# Patient Record
Sex: FEMALE | Race: WHITE | Employment: FULL TIME | URBAN - METROPOLITAN AREA
[De-identification: names, ages, dates, MRNs, and addresses within clinical notes are randomized per-mention and may not be internally consistent; named-entity substitution may affect disease eponyms.]

---

## 2018-04-04 ENCOUNTER — OFFICE VISIT (OUTPATIENT)
Dept: OBGYN | Facility: CLINIC | Age: 28
End: 2018-04-04
Attending: NURSE PRACTITIONER
Payer: COMMERCIAL

## 2018-04-04 VITALS
BODY MASS INDEX: 22.13 KG/M2 | WEIGHT: 146 LBS | SYSTOLIC BLOOD PRESSURE: 108 MMHG | HEART RATE: 60 BPM | HEIGHT: 68 IN | DIASTOLIC BLOOD PRESSURE: 67 MMHG

## 2018-04-04 DIAGNOSIS — J45.20 MILD INTERMITTENT ASTHMA WITHOUT COMPLICATION: ICD-10-CM

## 2018-04-04 DIAGNOSIS — F39 EPISODIC MOOD DISORDER (H): ICD-10-CM

## 2018-04-04 DIAGNOSIS — Z76.89 ESTABLISHING CARE WITH NEW DOCTOR, ENCOUNTER FOR: Primary | ICD-10-CM

## 2018-04-04 DIAGNOSIS — F32.81 PMDD (PREMENSTRUAL DYSPHORIC DISORDER): ICD-10-CM

## 2018-04-04 PROCEDURE — G0463 HOSPITAL OUTPT CLINIC VISIT: HCPCS | Mod: ZF

## 2018-04-04 RX ORDER — ALBUTEROL SULFATE 90 UG/1
1-2 AEROSOL, METERED RESPIRATORY (INHALATION)
COMMUNITY
Start: 2018-01-23 | End: 2018-04-04

## 2018-04-04 RX ORDER — ALBUTEROL SULFATE 90 UG/1
1-2 AEROSOL, METERED RESPIRATORY (INHALATION) EVERY 6 HOURS PRN
Qty: 1 INHALER | Refills: 1 | Status: SHIPPED | OUTPATIENT
Start: 2018-04-04 | End: 2018-11-08

## 2018-04-04 RX ORDER — ACETAMINOPHEN AND CODEINE PHOSPHATE 120; 12 MG/5ML; MG/5ML
1 SOLUTION ORAL DAILY
Qty: 28 TABLET | Refills: 3 | Status: SHIPPED | OUTPATIENT
Start: 2018-04-04 | End: 2018-07-24

## 2018-04-04 RX ORDER — ATOVAQUONE AND PROGUANIL HYDROCHLORIDE 250; 100 MG/1; MG/1
1 TABLET, FILM COATED ORAL
COMMUNITY
Start: 2018-01-23 | End: 2018-04-04

## 2018-04-04 ASSESSMENT — ASTHMA QUESTIONNAIRES
QUESTION_4 LAST FOUR WEEKS HOW OFTEN HAVE YOU USED YOUR RESCUE INHALER OR NEBULIZER MEDICATION (SUCH AS ALBUTEROL): TWO OR THREE TIMES PER WEEK
QUESTION_2 LAST FOUR WEEKS HOW OFTEN HAVE YOU HAD SHORTNESS OF BREATH: ONCE OR TWICE A WEEK
QUESTION_1 LAST FOUR WEEKS HOW MUCH OF THE TIME DID YOUR ASTHMA KEEP YOU FROM GETTING AS MUCH DONE AT WORK, SCHOOL OR AT HOME: NONE OF THE TIME
QUESTION_3 LAST FOUR WEEKS HOW OFTEN DID YOUR ASTHMA SYMPTOMS (WHEEZING, COUGHING, SHORTNESS OF BREATH, CHEST TIGHTNESS OR PAIN) WAKE YOU UP AT NIGHT OR EARLIER THAN USUAL IN THE MORNING: NOT AT ALL
ACT_TOTALSCORE: 21
QUESTION_5 LAST FOUR WEEKS HOW WOULD YOU RATE YOUR ASTHMA CONTROL: WELL CONTROLLED

## 2018-04-04 ASSESSMENT — ANXIETY QUESTIONNAIRES
5. BEING SO RESTLESS THAT IT IS HARD TO SIT STILL: NOT AT ALL
6. BECOMING EASILY ANNOYED OR IRRITABLE: SEVERAL DAYS
4. TROUBLE RELAXING: NOT AT ALL
7. FEELING AFRAID AS IF SOMETHING AWFUL MIGHT HAPPEN: NOT AT ALL
3. WORRYING TOO MUCH ABOUT DIFFERENT THINGS: NOT AT ALL
GAD7 TOTAL SCORE: 1
1. FEELING NERVOUS, ANXIOUS, OR ON EDGE: NOT AT ALL
2. NOT BEING ABLE TO STOP OR CONTROL WORRYING: NOT AT ALL

## 2018-04-04 ASSESSMENT — PAIN SCALES - GENERAL: PAINLEVEL: NO PAIN (0)

## 2018-04-04 NOTE — PROGRESS NOTES
"    Progress Note    SUBJECTIVE:  Yanni Mckenna is a 28 year old, , who requests to establish care and discuss cyclic premenstrual symptoms. She recently moved from [place] during the past year. Her last well woman exam in [place] revealed a normal pap and neg HPV.    Concerns today include:  1. Establish care, moved to [place] from [place] 9 months ago  2. Psych Hx: patient stopped medications in , previous dx of bipolar  3. Cyclic pre menstrual symptoms-mood and irritability prior to menses are her greatest concerns today. Has monthly menses on Mirena with clear premenstrual cyclic symptoms. She is particularly interested in non-medication interventions to address her symptoms.     Yanni reports having a history of mental illness, including bipolar disorder, for which she was prescribed several medications including: Abilify, Adderol, Wellbutrin, and Lamictal, but discontinued herself from all of these medications in . She has good weeks, particularly during the follicular phase of her menstrual cycle. She has been tracking her symptoms and associates her increased moodiness with her menses, noticing worsening mood swings, irritability, breast tenderness, and dysmenorrhea before and during menses.   Yanni indicates her symptoms also worsened after the \"honeymoon phase\" of her new job and recent move dissipated and once she moved in with her boyfriend. She reports being in a monogamous relationship with her boyfriend for the past 2.5 years.     PHQ-9 SCORE 2018   Total Score 6      TIMOTHY-7 SCORE 2018   Total Score 1     Normal LMP: 3/30/18  Yanni reports having regular menstrual periods, typically lasting 3-4 days, with mild flow and is using the Mirena IUD for contraception (placed ). She was last screened for STIs in , but declines screening today.     Social Hx: manages [employer] .   Exercise:; regular exercise 5-6 days per week doing running, spin, or yoga; also teaches yoga  "   Caffeine: 1-2 cups per day  Substance: ETOH few times per week, 1-2 drinks on 2 days per week   Tobacco use: never  Cannibus to help her fall asleep, nightly     Yanni reports a remote history of migraine with uncertain visuall changes starting on COCs >10 years ago.Used COCs faster this episode for 10 years. Denies any  history of blood clots, breast cancer or ovarian cancer, or liver disease.     Menstrual History:  Menstrual History 2018   Menarche age 10   Period Cycle (Days) 28   Period Duration (Days) 3   Method of Contraception Mirena IUD   Period Pattern Regular   Menstrual Flow Light   Menstrual Control Tampon   Dysmenorrhea Mild   PMS Symptoms Cramping;Mood Changes   Reviewed Today Yes     Last  No results found for: PAP  History of abnormal Pap smear: NO - age 30- 65 PAP every 3 years recommended    Last No results found for: HPV16  Last No results found for: HPV18  Last No results found for: HRHPV    Mammogram current: not applicable  Last Mammogram: N/A    HISTORY:    No current outpatient prescriptions on file prior to visit.  No current facility-administered medications on file prior to visit.   Allergies   Allergen Reactions     Amoxicillin Hives     Immunization History   Administered Date(s) Administered     HepA-Adult 2007     HepB, Unspecified 10/29/1997, 1997, 1998     Hpv, Unspecified  2007, 2007, 2008     Influenza (IIV3) PF 10/09/1994, 1994, 1994     MMR 1990, 1994     Meningococcal,unspecified 2007     Pedvax-hib 1990     Pneumococcal 23 valent 04/15/2016     TD (ADULT, 7+) 1999     Typhoid IM 2018     Typhoid Oral 2018       Obstetric History       T0      L0     SAB0   TAB0   Ectopic0   Multiple0   Live Births0      Past Medical History:   Diagnosis Date     Asthma      Past Surgical History:   Procedure Laterality Date     NO HISTORY OF SURGERY       History reviewed. No  "pertinent family history.  Social History     Social History     Marital status: Single     Spouse name: N/A     Number of children: N/A     Years of education: N/A     Occupational History           Social History Main Topics     Smoking status: Never Smoker     Smokeless tobacco: Never Used     Alcohol use 3.0 oz/week     5 Standard drinks or equivalent per week     Drug use: No     Sexual activity: Yes     Partners: Male     Birth control/ protection: IUD     Other Topics Concern     None     Social History Narrative    How much exercise per week? Active daily,     How much calcium per day? In foods       How much caffeine per day? 1 cup coffee    How much vitamin D per day? Multivitamin    Do you/your family wear seatbelts?  Yes    Do you/your family use safety helmets? No    Do you/your family use sunscreen? Yes    Do you/your family keep firearms in the home? No    Do you/your family have a smoke detector(s)? Yes         April 4, 2018 Fco Jimenez LPN         ROS  [unfilled]  No flowsheet data found.  No flowsheet data found.    EXAM:  Blood pressure 108/67, pulse 60, height 1.715 m (5' 7.5\"), weight 66.2 kg (146 lb). Body mass index is 22.53 kg/(m^2).  Constitutional: Well appearing woman in no acute distress, appropriately groomed   Oriented to time and place, engaged and interactive   Psychological: Appropriate mood  Eyes symmetrical, sclera clear and white, conjunctiva pink and moist   Mouth: moist mucous membranes, no visible dental caries   Skin: warm and dry, no visible rashes or lesions  Neuro: normal gait, no visible tremor   Musculoskeletal: Full ROM, no muscular weakness visible   30 minutes total time was spent in direct contact with patient and > 50% of the time in patient education and coordination of care.     ASSESSMENT:  Encounter Diagnoses   Name Primary?     Establishing care with new doctor, encounter for Yes     Episodic mood disorder (H)      PMDD (premenstrual " "dysphoric disorder)      Visit for preventive health examination       PLAN:   Orders Placed This Encounter   Procedures     ACUPUNCTURE REFERRAL     Orders Placed This Encounter   Medications     albuterol (PROAIR HFA/PROVENTIL HFA/VENTOLIN HFA) 108 (90 BASE) MCG/ACT Inhaler     Sig: Inhale 1-2 puffs into the lungs every 6 hours as needed for shortness of breath / dyspnea or wheezing     Dispense:  1 Inhaler     Refill:  1     norethindrone (MICRONOR) 0.35 MG per tablet     Sig: Take 1 tablet (0.35 mg) by mouth daily     Dispense:  28 tablet     Refill:  3     1. Trial patient on POP pill for 3 months to stop ovulation. Benefits, risks, and side effects reviewed with patient. Instructed Yanni to take start this pill today, taking one pill daily at the same time each day.   2. Schedule acupuncture if desires to help with mood changes, referral given per patient request  3. Teaching references on lifestyle management of PMDD reviewed with patient, handouts given  4. Recommend that she establish care with therapist  5. Start Micronor Rx and chart symptoms  6. Return to clinic in 3 months for follow up and an annual exam    I, Anh Perry, MSN, RN, NP Student, completed the PFSH and ROS. I then acted as a scribe for Marva SEWELL for the remainder of the visit. Anh Perry, MSN, RN WHNP Student.     \"I agree with the PFSH and ROS as completed by the NP Student, Anh Perry, except for changes made by me. The remainder of the encounter was performed by me and scribed by Anh Perry, MSN, RN, NDP-student. The scribed note accurately reflects my personal services and decisions made by me.    Marva SEWELL    "

## 2018-04-04 NOTE — LETTER
My Asthma Action Plan  Name: Yanni Mckenna   YOB: 1990  Date: 4/4/2018   My doctor: CHIOMA Gomez CNP   My clinic: [facility]        My Control Medicine: None  My Rescue Medicine: Albuterol (Proair/Ventolin/Proventil) inhaler 1-2 puffs PRN during exercise   My Asthma Severity: intermittent  Avoid your asthma triggers: exercise or sports and cold air               GREEN ZONE   Good Control    I feel good    No cough or wheeze    Can work, sleep and play without asthma symptoms       Take your asthma control medicine every day.     1. If exercise triggers your asthma, take your rescue medication    15 minutes before exercise or sports, and    During exercise if you have asthma symptoms  2. Spacer to use with inhaler: If you have a spacer, make sure to use it with your inhaler             YELLOW ZONE Getting Worse  I have ANY of these:    I do not feel good    Cough or wheeze    Chest feels tight    Wake up at night   1. Keep taking your Green Zone medications  2. Start taking your rescue medicine:    every 20 minutes for up to 1 hour. Then every 4 hours for 24-48 hours.  3. If you stay in the Yellow Zone for more than 12-24 hours, contact your doctor.  4. If you do not return to the Green Zone in 12-24 hours or you get worse, start taking your oral steroid medicine if prescribed by your provider.           RED ZONE Medical Alert - Get Help  I have ANY of these:    I feel awful    Medicine is not helping    Breathing getting harder    Trouble walking or talking    Nose opens wide to breathe       1. Take your rescue medicine NOW  2. If your provider has prescribed an oral steroid medicine, start taking it NOW  3. Call your doctor NOW  4. If you are still in the Red Zone after 20 minutes and you have not reached your doctor:    Take your rescue medicine again and    Call 911 or go to the emergency room right away    See your regular doctor within 2 weeks of an Emergency Room  or Urgent Care visit for follow-up treatment.          Annual Reminders:  Meet with Asthma Educator,  Flu Shot in the Fall, consider Pneumonia Vaccination for patients with asthma (aged 19 and older).    Pharmacy: [id number] - [place], [place] - [address] [place] [address]                      Asthma Triggers  How To Control Things That Make Your Asthma Worse    Triggers are things that make your asthma worse.  Look at the list below to help you find your triggers and what you can do about them.  You can help prevent asthma flare-ups by staying away from your triggers.      Trigger                                                          What you can do   Cigarette Smoke  Tobacco smoke can make asthma worse. Do not allow smoking in your home, car or around you.  Be sure no one smokes at a child s day care or school.  If you smoke, ask your health care provider for ways to help you quit.  Ask family members to quit too.  Ask your health care provider for a referral to Quit Plan to help you quit smoking, or call 9-942-817-PLAN.     Colds, Flu, Bronchitis  These are common triggers of asthma. Wash your hands often.  Don t touch your eyes, nose or mouth.  Get a flu shot every year.     Dust Mites  These are tiny bugs that live in cloth or carpet. They are too small to see. Wash sheets and blankets in hot water every week.   Encase pillows and mattress in dust mite proof covers.  Avoid having carpet if you can. If you have carpet, vacuum weekly.   Use a dust mask and HEPA vacuum.   Pollen and Outdoor Mold  Some people are allergic to trees, grass, or weed pollen, or molds. Try to keep your windows closed.  Limit time out doors when pollen count is high.   Ask you health care provider about taking medicine during allergy season.     Animal Dander  Some people are allergic to skin flakes, urine or saliva from pets with fur or feathers. Keep pets with fur or feathers out of your home.    If you can t keep the pet  outdoors, then keep the pet out of your bedroom.  Keep the bedroom door closed.  Keep pets off cloth furniture and away from stuffed toys.     Mice, Rats, and Cockroaches  Some people are allergic to the waste from these pests.   Cover food and garbage.  Clean up spills and food crumbs.  Store grease in the refrigerator.   Keep food out of the bedroom.   Indoor Mold  This can be a trigger if your home has high moisture. Fix leaking faucets, pipes, or other sources of water.   Clean moldy surfaces.  Dehumidify basement if it is damp and smelly.   Smoke, Strong Odors, and Sprays  These can reduce air quality. Stay away from strong odors and sprays, such as perfume, powder, hair spray, paints, smoke incense, paint, cleaning products, candles and new carpet.   Exercise or Sports  Some people with asthma have this trigger. Be active!  Ask your doctor about taking medicine before sports or exercise to prevent symptoms.    Warm up for 5-10 minutes before and after sports or exercise.     Other Triggers of Asthma  Cold air:  Cover your nose and mouth with a scarf.  Sometimes laughing or crying can be a trigger.  Some medicines and food can trigger asthma.

## 2018-04-04 NOTE — LETTER
My Asthma Action Plan  Name: Yanni Mckenna   YOB: 1990  Date: 4/4/2018   My doctor: CHIOMA Gomez CNP   My clinic: [facility]        My Control Medicine: None  My Rescue Medicine: Albuterol (Proair/Ventolin/Proventil) inhaler PRN   My Asthma Severity: { :201487}  Avoid your asthma triggers: { :455044}        {Is patient a child or adult?:311718}       GREEN ZONE   Good Control    I feel good    No cough or wheeze    Can work, sleep and play without asthma symptoms       Take your asthma control medicine every day.     1. If exercise triggers your asthma, take your rescue medication    15 minutes before exercise or sports, and    During exercise if you have asthma symptoms  2. Spacer to use with inhaler: If you have a spacer, make sure to use it with your inhaler             YELLOW ZONE Getting Worse  I have ANY of these:    I do not feel good    Cough or wheeze    Chest feels tight    Wake up at night   1. Keep taking your Green Zone medications  2. Start taking your rescue medicine:    every 20 minutes for up to 1 hour. Then every 4 hours for 24-48 hours.  3. If you stay in the Yellow Zone for more than 12-24 hours, contact your doctor.  4. If you do not return to the Green Zone in 12-24 hours or you get worse, start taking your oral steroid medicine if prescribed by your provider.           RED ZONE Medical Alert - Get Help  I have ANY of these:    I feel awful    Medicine is not helping    Breathing getting harder    Trouble walking or talking    Nose opens wide to breathe       1. Take your rescue medicine NOW  2. If your provider has prescribed an oral steroid medicine, start taking it NOW  3. Call your doctor NOW  4. If you are still in the Red Zone after 20 minutes and you have not reached your doctor:    Take your rescue medicine again and    Call 911 or go to the emergency room right away    See your regular doctor within 2 weeks of an Emergency Room or Urgent  Care visit for follow-up treatment.          Annual Reminders:  Meet with Asthma Educator,  Flu Shot in the Fall, consider Pneumonia Vaccination for patients with asthma (aged 19 and older).    Pharmacy: [id number] - [place], [place] - [address] [place] [address]                      Asthma Triggers  How To Control Things That Make Your Asthma Worse    Triggers are things that make your asthma worse.  Look at the list below to help you find your triggers and what you can do about them.  You can help prevent asthma flare-ups by staying away from your triggers.      Trigger                                                          What you can do   Cigarette Smoke  Tobacco smoke can make asthma worse. Do not allow smoking in your home, car or around you.  Be sure no one smokes at a child s day care or school.  If you smoke, ask your health care provider for ways to help you quit.  Ask family members to quit too.  Ask your health care provider for a referral to Quit Plan to help you quit smoking, or call 1-091-970-PLAN.     Colds, Flu, Bronchitis  These are common triggers of asthma. Wash your hands often.  Don t touch your eyes, nose or mouth.  Get a flu shot every year.     Dust Mites  These are tiny bugs that live in cloth or carpet. They are too small to see. Wash sheets and blankets in hot water every week.   Encase pillows and mattress in dust mite proof covers.  Avoid having carpet if you can. If you have carpet, vacuum weekly.   Use a dust mask and HEPA vacuum.   Pollen and Outdoor Mold  Some people are allergic to trees, grass, or weed pollen, or molds. Try to keep your windows closed.  Limit time out doors when pollen count is high.   Ask you health care provider about taking medicine during allergy season.     Animal Dander  Some people are allergic to skin flakes, urine or saliva from pets with fur or feathers. Keep pets with fur or feathers out of your home.    If you can t keep the pet outdoors, then  keep the pet out of your bedroom.  Keep the bedroom door closed.  Keep pets off cloth furniture and away from stuffed toys.     Mice, Rats, and Cockroaches  Some people are allergic to the waste from these pests.   Cover food and garbage.  Clean up spills and food crumbs.  Store grease in the refrigerator.   Keep food out of the bedroom.   Indoor Mold  This can be a trigger if your home has high moisture. Fix leaking faucets, pipes, or other sources of water.   Clean moldy surfaces.  Dehumidify basement if it is damp and smelly.   Smoke, Strong Odors, and Sprays  These can reduce air quality. Stay away from strong odors and sprays, such as perfume, powder, hair spray, paints, smoke incense, paint, cleaning products, candles and new carpet.   Exercise or Sports  Some people with asthma have this trigger. Be active!  Ask your doctor about taking medicine before sports or exercise to prevent symptoms.    Warm up for 5-10 minutes before and after sports or exercise.     Other Triggers of Asthma  Cold air:  Cover your nose and mouth with a scarf.  Sometimes laughing or crying can be a trigger.  Some medicines and food can trigger asthma.

## 2018-04-04 NOTE — PATIENT INSTRUCTIONS
"  Understanding PMS and Your Cycle  PMS (premenstrual syndrome) is a medical condition caused by the body's response to a normal menstrual cycle. The menstrual cycle is brought on by changing levels of hormones (chemical messengers) in the body. In some women, normal hormone changes are linked to decreases in serotonin, a brain chemical that improves mood. These changes lead to PMS symptoms each month.  The menstrual cycle  During the menstrual cycle, a series of hormone changes prepare a woman's body for pregnancy. The ovaries make hormones, which include estrogen and progesterone. Throughout the cycle, the levels of these hormones change. This causes the lining of the uterus (womb) to thicken. Hormone changes also lead to ovulation (release of an egg). If a woman doesn't become pregnant, her body sheds the thickened lining and the egg during the menstrual period. For many women, the menstrual cycle lasts 4 weeks (28 days). Some women have shorter cycles. Others have longer ones. No matter how many days your cycle is, you can have PMS only if you ovulate.  The PMS cycle  No one knows why some women have PMS and others don't. But PMS symptoms are closely linked to changing levels of estrogen, serotonin, and progesterone:    Estrogen rises during the first half of the menstrual cycle and drops during the second half. In some women, serotonin levels stay mostly steady. But in women with PMS, serotonin drops as estrogen drops. This means serotonin is lowest in the 2 weeks before the period. Women with low serotonin levels are likely to have symptoms of PMS.    Progesterone can have a \"calming\" effect on the body. This can ease physical symptoms caused by the body's monthly changes. In women with PMS, progesterone may not have this calming effect. This may make symptoms more severe.  Common symptoms of PMS  Physical symptoms  You may have some or all of the following:    Bloating (retaining water)    Breast tenderness  "   Food cravings    Muscle aches    Swelling of hands and feet    Appetite changes    Headache    Feeling tired    Skin problems    Abdominal pain  Emotional symptoms  You may have some or all of the following:    Mood swings    Depression    Crying spells    Irritability    Oversensitivity    Withdrawing from friends and family    Forgetfulness    Having trouble concentrating    Anxiety    Insomnia    Angry outbursts    Confusion    Changes in sexual desire  Date Last Reviewed: 3/1/2017    4624-5090 [employer]. [address] [place] [address], [place] [address]. All rights reserved. This information is not intended as a substitute for professional medical care. Always follow your healthcare professional's instructions.        Managing PMS: Diet and Nutrition     Nutrients in fresh fruits and vegetables can help you manage PMS.     Maintaining a healthy diet helps your body counter PMS. Certain foods boost serotonin levels and give you the energy to cope with symptoms. Other foods can be avoided to ease symptoms.  About supplements  Ask your healthcare provider about supplements before trying them.   Benefits of a balanced diet  To counter PMS symptoms, maintain a balanced diet. Eat foods from all the food groups: dairy, grains, fruits and vegetables, and protein. When planning meals, know that:    Calcium may ease mood swings, headache, bloating, and irritability. It's found in dairy products such as milk, cheese, and yogurt. Some juices, breads, cereals, and soy products have calcium added (fortified).    Magnesium may relieve bloating and breast tenderness. It's found in many foods, including fresh fruits and vegetables. To help your body get enough magnesium, eat 5 or more servings of a variety of fruits and vegetables a day.    Vitamin B6 helps the body use serotonin, thereby helping to ease depression. It's found in chicken, fish, potatoes, eggs, and carrots.    Vitamin E may reduce headache and breast  "tenderness. It's found in nuts such as almonds, peanuts, and hazelnuts. It's also found in green leafy vegetables.  \"Good mood\" foods  Eating foods high in carbohydrates (carbs) and fiber can help you manage PMS. That's because carbs raise serotonin levels. Carbs are also your body's main source of energy. To help keep energy and serotonin levels steady, eat small amounts throughout the day. High-fiber carbs include:    Whole-grain foods. Brown rice, whole-wheat pasta, whole-grain bread, and buckwheat noodles are good choices.    Fresh fruits and vegetables, especially when eaten unpeeled.    Beans and legumes, such as kidney beans, peas, and lentils.  Foods to limit  Some foods can make PMS symptoms worse. Know that:    Salt can cause bloating. Since canned vegetables are often high in salt, buy fresh instead. Flavor with herbs, lemon, or salt-free seasonings.    Sugar is a carb that provides only short bursts of energy. If you crave sugar, choose a food that's also high in fiber, like an unpeeled apple or a bran muffin.    Caffeine can disrupt sleep, which makes symptoms harder to cope with. Caffeine can also cause breast tenderness. Try to limit chocolate and caffeinated drinks, such as coffee or soda.    Alcohol can make you feel depressed and can disrupt sleep. Many kinds of alcohol are also high in sugar. You may try limiting the amount of alcohol you drink.  Date Last Reviewed: 3/1/2017    3744-6984 [employer]. [address] [place] [address], [place] [address]. All rights reserved. This information is not intended as a substitute for professional medical care. Always follow your healthcare professional's instructions.        Managing PMS: Lifestyle Changes    Coping with PMS takes energy. But, PMS symptoms can make you feel like you don t have the strength to cope. The trick is to work helpful strategies into your daily life. Be active during the day and get enough sleep at night. Take time to relax. " And don t be afraid to ask for support.  Being active    Activity raises the amount of oxygen in your body. This makes you feel better and gives you more energy. Exercise may also raise serotonin levels. For best results:    Try aerobic activities, such as walking, jogging, biking, swimming, or yoga.    Exercise for 30 minutes, most days of the week. If this seems like too much, start with 10 minutes a day and work your way up.    Find ways to fit activity into your day. Try taking the stairs instead of the elevator.  Sleeping well  When you re tired, PMS symptoms can be harder to cope with. Aim for at least 8 hours of sleep a night. To sleep better:    Follow a routine before bed. For instance, brush your teeth, read for half an hour, and turn out the lights at 10 p.m. every night.    Pull down window shades and keep pets out of the bedroom. If you re a light sleeper, try wearing earplugs and an eye mask to block out noise and light.  Taking time to relax  Being relaxed can give you the energy to deal with life s ups and downs. This makes even PMS symptoms easier to cope with. Learn to relax through simple techniques you can do anytime, anywhere. If you think you re too busy, start with just 5 minutes a day. Try:    Taking in a slow, deep breath through your nose. Hold it for 5 counts, then exhale through your mouth. Repeat this 3 times.    Picturing yourself in a peaceful place, such as the countryside. Explore with your mind. Hear birds. Smell freshly cut grass. Enjoy a mental vacation.    Stretching to relax muscles and reduce aches. (If you have back problems, ask your healthcare provider about stretches that are safe for you.)  Finding support  You don t have to deal with PMS alone. To help you cope:    Talk to family, friends, and coworkers.    Let them know how they can help when you re dealing with PMS symptoms.    Chat with female friends. Support each other. You may learn some new coping strategies.     Join a support group for women with PMS. Or try a stress management group. Ask your healthcare provider for resources.  Date Last Reviewed: 2/1/2017 2000-2017 [employer]. [address] [place] [address], [place] [address]. All rights reserved. This information is not intended as a substitute for professional medical care. Always follow your healthcare professional's instructions.        Managing PMS: Medications  With your healthcare provider s help, you may find a medicine that works for you. Make sure you know how to use your medicine. Take medicines only as directed. And always read warning labels. Talk to your healthcare provider or pharmacist if you have questions.  NSAIDs  NSAIDs (nonsteroidal anti-inflammatory drugs) relieve pain. Some can be bought over the counter. These include aspirin, ibuprofen, and naproxen. Stronger NSAIDs may be prescribed for more severe symptoms.    Benefits. NSAIDs relieve headaches, muscle aches, and other physical symptoms. They work best when taken at the first sign of symptoms.    Side effects. NSAIDs can cause stomach upset. Taking them with food may help.    Other concerns. You shouldn t take more than one type of NSAID at a time. Also, NSAIDs may not be safe if you have asthma, heart disease, or kidney problems.  Birth control pills  Birth control pills may be prescribed to control hormone levels. When prescribed for PMS, pills may be taken every day for up to 9 weeks.    Benefits. Birth control pills ease a range of symptoms including breast tenderness and appetite changes.    Side effects. Birth control pills can cause stomach upset and headache.    Other concerns. Rarely, birth control pills can cause blood clots. The risk is higher for cigarette smokers over age 35.  SSRIs  SSRIs (selective serotonin reuptake inhibitors) may be prescribed to help keep serotonin levels stable. SSRIs may be taken every day. Or, they may be taken only during the 2 weeks before your  period.    Benefits. SSRIs help relieve emotional symptoms including irritability, depression, and mood changes. They also ease physical symptoms such as bloating, breast tenderness, and appetite changes.    Side effects. SSRIs can cause stomach upset, sleep problems, anxiety, and headache. SSRIs may also reduce interest in sex. This is less likely when SSRIs are taken only for part of the menstrual cycle.    Other concerns. SSRIs shouldn t be used if you take certain antidepressants.  GNRH agonists  GNRH agonists may be used in severe cases when SSRIs and birth control pills are ineffective or can't be tolerated    Benefits. GNRH agonists stop the normal hormonal cycle effectively causing a temporary menopause so the cyclic changes are no longer present.    Side effects. GNRH agonists stop menses, cause hot flashes and facundo loss unless given with add back treatment, which consists of continuous doses of estrogen and progestin.  Surgery  This consists of removing the ovaries and should be reserved for very severe cases that do not respond to medical treatment. This should only be considered after very careful consideration of the long-term effects and only after childbearing is complete.  Date Last Reviewed: 3/1/2017    1039-3975 [employer]. [address] [place] [address]. All rights reserved. This information is not intended as a substitute for professional medical care. Always follow your healthcare professional's instructions.        PMS: Tracking Your Symptoms  Track your PMS symptoms to better understand when they may occur. Rank each symptom s severity from  worst  to  none.  Track any other symptoms in the blank rows. At the top of the chart, [place] the days of your period. This will help identify the weeks of your cycle.    Date Last Reviewed: 10/1/2016    4793-5400 [employer]. [address] [place] [address]. All rights reserved. This information is not  intended as a substitute for professional medical care. Always follow your healthcare professional's instructions.        PMS: Tracking Your Symptoms  Track your PMS symptoms to better understand when they may occur. Rank each symptom s severity from  worst  to  none.  Track any other symptoms in the blank rows. At the top of the chart, [place] the days of your period. This will help identify the weeks of your cycle.    Date Last Reviewed: 10/1/2016    4260-4670 [employer]. [address] [place] [address], [place] [address]. All rights reserved. This information is not intended as a substitute for professional medical care. Always follow your healthcare professional's instructions.          PREVENTIVE HEALTH RECOMMENDATIONS:   Most women need a yearly breast and pelvic exam.    A PAP screen, a test done DURING a pelvic exam, is NO longer recommended yearly.    March 2013, screening guidelines recommended by ACOG for PAP screen are:    1) First pap at age 21.    2) Pap every 3 years until age 30.    3) After age 30, pap every 3 years or Pap with HR HPV screen every 5 years until age 65.  4) Women do NOT need a vaginal Pap screen after a hysterectomy (surgical removal of the uterus) when they have not had cancer.    Exceptions:  1) Yearly pap if HIV+ or immunosuppressed secondary to organ transplant  2) BELINDA II-III continue routine screening for 20 years.    I encourage you continue looking for opportunities to choose a healthy lifestyle:       * Choose to eat a heart healthy diet. Check out the FOOD PLATE guidelines at: http://www.choosemyplate.gov/ for helpful hints on weight and cholesterol management.  Balance your caloric intake with exercise to maintain a BMI in the 22 to 26 range. For bone health: Eat calcium-rich foods like yogurt, broccoli or take chewable calcium pills (500 to 600 mg) twice a day with food.       * Exercise for at least an average of 30 minutes a day, 5 days of the week. This will help  you control your weight, release stress, and help prevent disease.      * Take a Vitamin D3 supplement daily fall through spring and during summer unless you raly99-21' full body sun exposure to skin without sunscreen.      * DO wear sunscreen to prevent skin cancer after the first 15-30 minutes.      * Identify stressors in your life, find ways to release the stress, and, make time for yourself. PLEASE ask for help if mood changes last longer than two weeks.     * Limit alcohol to one drink per day.  No smoking.  Avoid second hand smoke. If you smoke, ask for help to stop.       *  If you are in a sexual relationship, talk with your partner about possible infection risks and take action to protect yourself from exposure to a sexual infection.    Please request an infection screen for STIs (sexually transmitted infections) if you are less than age 26 OR believe that you may be at risk.     Get a flu shot each year. Get a tetanus shot every 10 years. EVERYONE needs a pertussis (Whooping cough) booster.    See your dentist twice a year for an exam and preventive care cleaning.     Consider the following screen tests:    1) cholesterol test every 5 years.     2) yearly mammogram after age 40 unless you have identified risks.    3) colonoscopy every 10 years after age 50 unless you have identified risks.    4) diabetes blood test screening if you are at risk for diabetes.      Additional information that you may also find helpful:  The Internet now gives us access to LOTS of information -- some of it helpful, research documented and also plenty of harmful, anecdotal information that may not pertain to your situtaion. Consider visiting the following websites for accurate health information:    www.vitamindcouncil.org/ : Info and ongoing research re Vitamin D    www.fairview.org : Up to date and easily searchable information on multiple topics.    www.medlineplus.gov : medication info, interactive tutorials, watch real  surgeries online    www.cdc.gov : public health info, travel advisories, epidemics (H1N1)    www.jatin/std.org: current research re diagnosis, treatment and prevention of sexually contacted infections.    www.health.[facility].[place].us : [place] dept of heatl, public health issues in [place], N1N1    www.familydoctor.org : good info from the Academy of Family Physicians      Return to clinic in 3 months for follow up  Recommend establish care with therapist  Start Rx, chart symptoms

## 2018-04-04 NOTE — LETTER
My Asthma Action Plan  Name: Yanni Mckenna   YOB: 1990  Date: 4/4/2018   My doctor: CHIOMA Gomez CNP   My clinic: [facility]        My Control Medicine: None  My Rescue Medicine: Albuterol (Proair/Ventolin/Proventil) inhaler 1-2 puffs PRN with exercise  My Oral Steroid Medicine: None My Asthma Severity: intermittent  Avoid your asthma triggers: exercise or sports and cold air               GREEN ZONE   Good Control    I feel good    No cough or wheeze    Can work, sleep and play without asthma symptoms       Take your asthma control medicine every day.     1. If exercise triggers your asthma, take your rescue medication    15 minutes before exercise or sports, and    During exercise if you have asthma symptoms  2. Spacer to use with inhaler: If you have a spacer, make sure to use it with your inhaler             YELLOW ZONE Getting Worse  I have ANY of these:    I do not feel good    Cough or wheeze    Chest feels tight    Wake up at night   1. Keep taking your Green Zone medications  2. Start taking your rescue medicine:    every 20 minutes for up to 1 hour. Then every 4 hours for 24-48 hours.  3. If you stay in the Yellow Zone for more than 12-24 hours, contact your doctor.  4. If you do not return to the Green Zone in 12-24 hours or you get worse, start taking your oral steroid medicine if prescribed by your provider.           RED ZONE Medical Alert - Get Help  I have ANY of these:    I feel awful    Medicine is not helping    Breathing getting harder    Trouble walking or talking    Nose opens wide to breathe       1. Take your rescue medicine NOW  2. If your provider has prescribed an oral steroid medicine, start taking it NOW  3. Call your doctor NOW  4. If you are still in the Red Zone after 20 minutes and you have not reached your doctor:    Take your rescue medicine again and    Call 911 or go to the emergency room right away    See your regular doctor within  2 weeks of an Emergency Room or Urgent Care visit for follow-up treatment.          Annual Reminders:  Meet with Asthma Educator,  Flu Shot in the Fall, consider Pneumonia Vaccination for patients with asthma (aged 19 and older).    Pharmacy: [id number] - [place], [place] - [address] [place] [address]                      Asthma Triggers  How To Control Things That Make Your Asthma Worse    Triggers are things that make your asthma worse.  Look at the list below to help you find your triggers and what you can do about them.  You can help prevent asthma flare-ups by staying away from your triggers.      Trigger                                                          What you can do   Cigarette Smoke  Tobacco smoke can make asthma worse. Do not allow smoking in your home, car or around you.  Be sure no one smokes at a child s day care or school.  If you smoke, ask your health care provider for ways to help you quit.  Ask family members to quit too.  Ask your health care provider for a referral to Quit Plan to help you quit smoking, or call 6-262-685-PLAN.     Colds, Flu, Bronchitis  These are common triggers of asthma. Wash your hands often.  Don t touch your eyes, nose or mouth.  Get a flu shot every year.     Dust Mites  These are tiny bugs that live in cloth or carpet. They are too small to see. Wash sheets and blankets in hot water every week.   Encase pillows and mattress in dust mite proof covers.  Avoid having carpet if you can. If you have carpet, vacuum weekly.   Use a dust mask and HEPA vacuum.   Pollen and Outdoor Mold  Some people are allergic to trees, grass, or weed pollen, or molds. Try to keep your windows closed.  Limit time out doors when pollen count is high.   Ask you health care provider about taking medicine during allergy season.     Animal Dander  Some people are allergic to skin flakes, urine or saliva from pets with fur or feathers. Keep pets with fur or feathers out of your home.    If  you can t keep the pet outdoors, then keep the pet out of your bedroom.  Keep the bedroom door closed.  Keep pets off cloth furniture and away from stuffed toys.     Mice, Rats, and Cockroaches  Some people are allergic to the waste from these pests.   Cover food and garbage.  Clean up spills and food crumbs.  Store grease in the refrigerator.   Keep food out of the bedroom.   Indoor Mold  This can be a trigger if your home has high moisture. Fix leaking faucets, pipes, or other sources of water.   Clean moldy surfaces.  Dehumidify basement if it is damp and smelly.   Smoke, Strong Odors, and Sprays  These can reduce air quality. Stay away from strong odors and sprays, such as perfume, powder, hair spray, paints, smoke incense, paint, cleaning products, candles and new carpet.   Exercise or Sports  Some people with asthma have this trigger. Be active!  Ask your doctor about taking medicine before sports or exercise to prevent symptoms.    Warm up for 5-10 minutes before and after sports or exercise.     Other Triggers of Asthma  Cold air:  Cover your nose and mouth with a scarf.  Sometimes laughing or crying can be a trigger.  Some medicines and food can trigger asthma.

## 2018-04-04 NOTE — LETTER
Date:April 5, 2018      Patient was self referred, no letter generated. Do not send.        [facility] Information

## 2018-04-04 NOTE — LETTER
"2018       RE: Yanni Mckenna  [address] [facility] [address]     Dear Colleague,    Thank you for referring your patient, Yanni Mckenna, to the [facility] at [facility]. Please see a copy of my visit note below.        Progress Note    SUBJECTIVE:  Yanni Mckenna is a 28 year old, , who requests to establish care and discuss cyclic premenstrual symptoms. She recently moved from [place] during the past year. Her last well woman exam in [place] revealed a normal pap and neg HPV.    Concerns today include:  1. Establish care, moved to [place] from [place] 9 months ago  2. Psych Hx: patient stopped medications in , previous dx of bipolar  3. Cyclic pre menstrual symptoms-mood and irritability prior to menses are her greatest concerns today. Has monthly menses on Mirena with clear premenstrual cyclic symptoms. She is particularly interested in non-medication interventions to address her symptoms.     Yanni reports having a history of mental illness, including bipolar disorder, for which she was prescribed several medications including: Abilify, Adderol, Wellbutrin, and Lamictal, but discontinued herself from all of these medications in . She has good weeks, particularly during the follicular phase of her menstrual cycle. She has been tracking her symptoms and associates her increased moodiness with her menses, noticing worsening mood swings, irritability, breast tenderness, and dysmenorrhea before and during menses.   Yanni indicates her symptoms also worsened after the \"honeymoon phase\" of her new job and recent move dissipated and once she moved in with her boyfriend. She reports being in a monogamous relationship with her boyfriend for the past 2.5 years.     PHQ-9 SCORE 2018   Total Score 6      TIMOTHY-7 SCORE 2018   Total Score 1     Normal LMP: 3/30/18  Yanni reports having regular menstrual periods, typically lasting 3-4 days, with mild " flow and is using the Mirena IUD for contraception (placed 2014). She was last screened for STIs in 2016, but declines screening today.     Social Hx: manages [employer] .   Exercise:; regular exercise 5-6 days per week doing running, spin, or yoga; also teaches yoga    Caffeine: 1-2 cups per day  Substance: ETOH few times per week, 1-2 drinks on 2 days per week   Tobacco use: never  Cannibus to help her fall asleep, nightly     Yanni reports a remote history of migraine with uncertain visuall changes starting on COCs >10 years ago.Used COCs faster this episode for 10 years. Denies any  history of blood clots, breast cancer or ovarian cancer, or liver disease.     Menstrual History:  Menstrual History 4/4/2018   Menarche age 10   Period Cycle (Days) 28   Period Duration (Days) 3   Method of Contraception Mirena IUD   Period Pattern Regular   Menstrual Flow Light   Menstrual Control Tampon   Dysmenorrhea Mild   PMS Symptoms Cramping;Mood Changes   Reviewed Today Yes     Last  No results found for: PAP  History of abnormal Pap smear: NO - age 30- 65 PAP every 3 years recommended    Last No results found for: HPV16  Last No results found for: HPV18  Last No results found for: HRHPV    Mammogram current: not applicable  Last Mammogram: N/A    HISTORY:    No current outpatient prescriptions on file prior to visit.  No current facility-administered medications on file prior to visit.   Allergies   Allergen Reactions     Amoxicillin Hives     Immunization History   Administered Date(s) Administered     HepA-Adult 04/18/2007     HepB, Unspecified 10/29/1997, 11/24/1997, 04/29/1998     Hpv, Unspecified  04/04/2007, 06/13/2007, 01/09/2008     Influenza (IIV3) PF 10/09/1994, 11/08/1994, 12/05/1994     MMR 06/21/1990, 04/09/1994     Meningococcal,unspecified 04/18/2007     Pedvax-hib 08/07/1990     Pneumococcal 23 valent 04/15/2016     TD (ADULT, 7+) 04/13/1999     Typhoid IM 02/23/2018     Typhoid Oral 01/23/2018  "      Obstetric History       T0      L0     SAB0   TAB0   Ectopic0   Multiple0   Live Births0      Past Medical History:   Diagnosis Date     Asthma      Past Surgical History:   Procedure Laterality Date     NO HISTORY OF SURGERY       History reviewed. No pertinent family history.  Social History     Social History     Marital status: Single     Spouse name: N/A     Number of children: N/A     Years of education: N/A     Occupational History           Social History Main Topics     Smoking status: Never Smoker     Smokeless tobacco: Never Used     Alcohol use 3.0 oz/week     5 Standard drinks or equivalent per week     Drug use: No     Sexual activity: Yes     Partners: Male     Birth control/ protection: IUD     Other Topics Concern     None     Social History Narrative    How much exercise per week? Active daily,     How much calcium per day? In foods       How much caffeine per day? 1 cup coffee    How much vitamin D per day? Multivitamin    Do you/your family wear seatbelts?  Yes    Do you/your family use safety helmets? No    Do you/your family use sunscreen? Yes    Do you/your family keep firearms in the home? No    Do you/your family have a smoke detector(s)? Yes         2018 Fco Jimenez LPN         ROS  [unfilled]  No flowsheet data found.  No flowsheet data found.    EXAM:  Blood pressure 108/67, pulse 60, height 1.715 m (5' 7.5\"), weight 66.2 kg (146 lb). Body mass index is 22.53 kg/(m^2).  Constitutional: Well appearing woman in no acute distress, appropriately groomed   Oriented to time and place, engaged and interactive   Psychological: Appropriate mood  Eyes symmetrical, sclera clear and white, conjunctiva pink and moist   Mouth: moist mucous membranes, no visible dental caries   Skin: warm and dry, no visible rashes or lesions  Neuro: normal gait, no visible tremor   Musculoskeletal: Full ROM, no muscular weakness visible   30 minutes total time was " "spent in direct contact with patient and > 50% of the time in patient education and coordination of care.     ASSESSMENT:  Encounter Diagnoses   Name Primary?     Establishing care with new doctor, encounter for Yes     Episodic mood disorder (H)      PMDD (premenstrual dysphoric disorder)      Visit for preventive health examination       PLAN:   Orders Placed This Encounter   Procedures     ACUPUNCTURE REFERRAL     Orders Placed This Encounter   Medications     albuterol (PROAIR HFA/PROVENTIL HFA/VENTOLIN HFA) 108 (90 BASE) MCG/ACT Inhaler     Sig: Inhale 1-2 puffs into the lungs every 6 hours as needed for shortness of breath / dyspnea or wheezing     Dispense:  1 Inhaler     Refill:  1     norethindrone (MICRONOR) 0.35 MG per tablet     Sig: Take 1 tablet (0.35 mg) by mouth daily     Dispense:  28 tablet     Refill:  3     1. Trial patient on POP pill for 3 months to stop ovulation. Benefits, risks, and side effects reviewed with patient. Instructed Yanni to take start this pill today, taking one pill daily at the same time each day.   2. Schedule acupuncture if desires to help with mood changes, referral given per patient request  3. Teaching references on lifestyle management of PMDD reviewed with patient, handouts given  4. Recommend that she establish care with therapist  5. Start Micronor Rx and chart symptoms  6. Return to clinic in 3 months for follow up and an annual exam    I, Anh Perry, MSN, RN, NP Student, completed the PFSH and ROS. I then acted as a scribe for Marva SEWELL for the remainder of the visit. Anh Perry, MSN, RN NP Student.     \"I agree with the PFSH and ROS as completed by the NP Student, Anh Perry, except for changes made by me. The remainder of the encounter was performed by me and scribed by Anh Perry, MSN, RN, NDP-student. The scribed note accurately reflects my personal services and decisions made by me.    Marva SEWELL      Again, thank " you for allowing me to participate in the care of your patient.      Sincerely,    CHIOMA Gomez CNP

## 2018-04-04 NOTE — LETTER
My Asthma Action Plan  Name: Yanni Mckenna   YOB: 1990  Date: 4/4/2018   My doctor: CHIOMA Gomez CNP   My clinic: [facility]        My Control Medicine: None  My Rescue Medicine: Albuterol (Proair/Ventolin/Proventil) inhaler 1-2 puffs PRN SOB, with exercise   My Asthma Severity: intermittent  Avoid your asthma triggers: exercise or sports and cold air               GREEN ZONE   Good Control    I feel good    No cough or wheeze    Can work, sleep and play without asthma symptoms       Take your asthma control medicine every day.     1. If exercise triggers your asthma, take your rescue medication    15 minutes before exercise or sports, and    During exercise if you have asthma symptoms  2. Spacer to use with inhaler: If you have a spacer, make sure to use it with your inhaler             YELLOW ZONE Getting Worse  I have ANY of these:    I do not feel good    Cough or wheeze    Chest feels tight    Wake up at night   1. Keep taking your Green Zone medications  2. Start taking your rescue medicine:    every 20 minutes for up to 1 hour. Then every 4 hours for 24-48 hours.  3. If you stay in the Yellow Zone for more than 12-24 hours, contact your doctor.  4. If you do not return to the Green Zone in 12-24 hours or you get worse, start taking your oral steroid medicine if prescribed by your provider.           RED ZONE Medical Alert - Get Help  I have ANY of these:    I feel awful    Medicine is not helping    Breathing getting harder    Trouble walking or talking    Nose opens wide to breathe       1. Take your rescue medicine NOW  2. If your provider has prescribed an oral steroid medicine, start taking it NOW  3. Call your doctor NOW  4. If you are still in the Red Zone after 20 minutes and you have not reached your doctor:    Take your rescue medicine again and    Call 911 or go to the emergency room right away    See your regular doctor within 2 weeks of an Emergency  Room or Urgent Care visit for follow-up treatment.          Annual Reminders:  Meet with Asthma Educator,  Flu Shot in the Fall, consider Pneumonia Vaccination for patients with asthma (aged 19 and older).    Pharmacy: [id number] - [place], [place] - [address] [place] [address]                      Asthma Triggers  How To Control Things That Make Your Asthma Worse    Triggers are things that make your asthma worse.  Look at the list below to help you find your triggers and what you can do about them.  You can help prevent asthma flare-ups by staying away from your triggers.      Trigger                                                          What you can do   Cigarette Smoke  Tobacco smoke can make asthma worse. Do not allow smoking in your home, car or around you.  Be sure no one smokes at a child s day care or school.  If you smoke, ask your health care provider for ways to help you quit.  Ask family members to quit too.  Ask your health care provider for a referral to Quit Plan to help you quit smoking, or call 1-405-089-PLAN.     Colds, Flu, Bronchitis  These are common triggers of asthma. Wash your hands often.  Don t touch your eyes, nose or mouth.  Get a flu shot every year.     Dust Mites  These are tiny bugs that live in cloth or carpet. They are too small to see. Wash sheets and blankets in hot water every week.   Encase pillows and mattress in dust mite proof covers.  Avoid having carpet if you can. If you have carpet, vacuum weekly.   Use a dust mask and HEPA vacuum.   Pollen and Outdoor Mold  Some people are allergic to trees, grass, or weed pollen, or molds. Try to keep your windows closed.  Limit time out doors when pollen count is high.   Ask you health care provider about taking medicine during allergy season.     Animal Dander  Some people are allergic to skin flakes, urine or saliva from pets with fur or feathers. Keep pets with fur or feathers out of your home.    If you can t keep the pet  outdoors, then keep the pet out of your bedroom.  Keep the bedroom door closed.  Keep pets off cloth furniture and away from stuffed toys.     Mice, Rats, and Cockroaches  Some people are allergic to the waste from these pests.   Cover food and garbage.  Clean up spills and food crumbs.  Store grease in the refrigerator.   Keep food out of the bedroom.   Indoor Mold  This can be a trigger if your home has high moisture. Fix leaking faucets, pipes, or other sources of water.   Clean moldy surfaces.  Dehumidify basement if it is damp and smelly.   Smoke, Strong Odors, and Sprays  These can reduce air quality. Stay away from strong odors and sprays, such as perfume, powder, hair spray, paints, smoke incense, paint, cleaning products, candles and new carpet.   Exercise or Sports  Some people with asthma have this trigger. Be active!  Ask your doctor about taking medicine before sports or exercise to prevent symptoms.    Warm up for 5-10 minutes before and after sports or exercise.     Other Triggers of Asthma  Cold air:  Cover your nose and mouth with a scarf.  Sometimes laughing or crying can be a trigger.  Some medicines and food can trigger asthma.

## 2018-04-04 NOTE — MR AVS SNAPSHOT
After Visit Summary   4/4/2018    Yanni Mckenna    MRN: [id number]           Patient Information     Date Of Birth          1990        Visit Information        Provider Department      4/4/2018 8:20 AM Marva Lan APRN [facility]        Today's Diagnoses     Establishing care with new doctor, encounter for    -  1    Episodic mood disorder (H)        PMDD (premenstrual dysphoric disorder)        Visit for preventive health examination          Care Instructions      Understanding PMS and Your Cycle  PMS (premenstrual syndrome) is a medical condition caused by the body's response to a normal menstrual cycle. The menstrual cycle is brought on by changing levels of hormones (chemical messengers) in the body. In some women, normal hormone changes are linked to decreases in serotonin, a brain chemical that improves mood. These changes lead to PMS symptoms each month.  The menstrual cycle  During the menstrual cycle, a series of hormone changes prepare a woman's body for pregnancy. The ovaries make hormones, which include estrogen and progesterone. Throughout the cycle, the levels of these hormones change. This causes the lining of the uterus (womb) to thicken. Hormone changes also lead to ovulation (release of an egg). If a woman doesn't become pregnant, her body sheds the thickened lining and the egg during the menstrual period. For many women, the menstrual cycle lasts 4 weeks (28 days). Some women have shorter cycles. Others have longer ones. No matter how many days your cycle is, you can have PMS only if you ovulate.  The PMS cycle  No one knows why some women have PMS and others don't. But PMS symptoms are closely linked to changing levels of estrogen, serotonin, and progesterone:    Estrogen rises during the first half of the menstrual cycle and drops during the second half. In some women, serotonin levels stay mostly steady. But in women with PMS, serotonin drops  "as estrogen drops. This means serotonin is lowest in the 2 weeks before the period. Women with low serotonin levels are likely to have symptoms of PMS.    Progesterone can have a \"calming\" effect on the body. This can ease physical symptoms caused by the body's monthly changes. In women with PMS, progesterone may not have this calming effect. This may make symptoms more severe.  Common symptoms of PMS  Physical symptoms  You may have some or all of the following:    Bloating (retaining water)    Breast tenderness    Food cravings    Muscle aches    Swelling of hands and feet    Appetite changes    Headache    Feeling tired    Skin problems    Abdominal pain  Emotional symptoms  You may have some or all of the following:    Mood swings    Depression    Crying spells    Irritability    Oversensitivity    Withdrawing from friends and family    Forgetfulness    Having trouble concentrating    Anxiety    Insomnia    Angry outbursts    Confusion    Changes in sexual desire  Date Last Reviewed: 3/1/2017    7427-4219 [employer]. [address] [place] [address]. All rights reserved. This information is not intended as a substitute for professional medical care. Always follow your healthcare professional's instructions.        Managing PMS: Diet and Nutrition     Nutrients in fresh fruits and vegetables can help you manage PMS.     Maintaining a healthy diet helps your body counter PMS. Certain foods boost serotonin levels and give you the energy to cope with symptoms. Other foods can be avoided to ease symptoms.  About supplements  Ask your healthcare provider about supplements before trying them.   Benefits of a balanced diet  To counter PMS symptoms, maintain a balanced diet. Eat foods from all the food groups: dairy, grains, fruits and vegetables, and protein. When planning meals, know that:    Calcium may ease mood swings, headache, bloating, and irritability. It's found in dairy products such as " "milk, cheese, and yogurt. Some juices, breads, cereals, and soy products have calcium added (fortified).    Magnesium may relieve bloating and breast tenderness. It's found in many foods, including fresh fruits and vegetables. To help your body get enough magnesium, eat 5 or more servings of a variety of fruits and vegetables a day.    Vitamin B6 helps the body use serotonin, thereby helping to ease depression. It's found in chicken, fish, potatoes, eggs, and carrots.    Vitamin E may reduce headache and breast tenderness. It's found in nuts such as almonds, peanuts, and hazelnuts. It's also found in green leafy vegetables.  \"Good mood\" foods  Eating foods high in carbohydrates (carbs) and fiber can help you manage PMS. That's because carbs raise serotonin levels. Carbs are also your body's main source of energy. To help keep energy and serotonin levels steady, eat small amounts throughout the day. High-fiber carbs include:    Whole-grain foods. Brown rice, whole-wheat pasta, whole-grain bread, and buckwheat noodles are good choices.    Fresh fruits and vegetables, especially when eaten unpeeled.    Beans and legumes, such as kidney beans, peas, and lentils.  Foods to limit  Some foods can make PMS symptoms worse. Know that:    Salt can cause bloating. Since canned vegetables are often high in salt, buy fresh instead. Flavor with herbs, lemon, or salt-free seasonings.    Sugar is a carb that provides only short bursts of energy. If you crave sugar, choose a food that's also high in fiber, like an unpeeled apple or a bran muffin.    Caffeine can disrupt sleep, which makes symptoms harder to cope with. Caffeine can also cause breast tenderness. Try to limit chocolate and caffeinated drinks, such as coffee or soda.    Alcohol can make you feel depressed and can disrupt sleep. Many kinds of alcohol are also high in sugar. You may try limiting the amount of alcohol you drink.  Date Last Reviewed: 3/1/2017    2486-6609 " [employer]. [address] [place] [address], [place] [address]. All rights reserved. This information is not intended as a substitute for professional medical care. Always follow your healthcare professional's instructions.        Managing PMS: Lifestyle Changes    Coping with PMS takes energy. But, PMS symptoms can make you feel like you don t have the strength to cope. The trick is to work helpful strategies into your daily life. Be active during the day and get enough sleep at night. Take time to relax. And don t be afraid to ask for support.  Being active    Activity raises the amount of oxygen in your body. This makes you feel better and gives you more energy. Exercise may also raise serotonin levels. For best results:    Try aerobic activities, such as walking, jogging, biking, swimming, or yoga.    Exercise for 30 minutes, most days of the week. If this seems like too much, start with 10 minutes a day and work your way up.    Find ways to fit activity into your day. Try taking the stairs instead of the elevator.  Sleeping well  When you re tired, PMS symptoms can be harder to cope with. Aim for at least 8 hours of sleep a night. To sleep better:    Follow a routine before bed. For instance, brush your teeth, read for half an hour, and turn out the lights at 10 p.m. every night.    Pull down window shades and keep pets out of the bedroom. If you re a light sleeper, try wearing earplugs and an eye mask to block out noise and light.  Taking time to relax  Being relaxed can give you the energy to deal with life s ups and downs. This makes even PMS symptoms easier to cope with. Learn to relax through simple techniques you can do anytime, anywhere. If you think you re too busy, start with just 5 minutes a day. Try:    Taking in a slow, deep breath through your nose. Hold it for 5 counts, then exhale through your mouth. Repeat this 3 times.    Picturing yourself in a peaceful place, such as the countryside.  Explore with your mind. Hear birds. Smell freshly cut grass. Enjoy a mental vacation.    Stretching to relax muscles and reduce aches. (If you have back problems, ask your healthcare provider about stretches that are safe for you.)  Finding support  You don t have to deal with PMS alone. To help you cope:    Talk to family, friends, and coworkers.    Let them know how they can help when you re dealing with PMS symptoms.    Chat with female friends. Support each other. You may learn some new coping strategies.    Join a support group for women with PMS. Or try a stress management group. Ask your healthcare provider for resources.  Date Last Reviewed: 2/1/2017 2000-2017 [employer]. [address] [place] [address], [place] [address]. All rights reserved. This information is not intended as a substitute for professional medical care. Always follow your healthcare professional's instructions.        Managing PMS: Medications  With your healthcare provider s help, you may find a medicine that works for you. Make sure you know how to use your medicine. Take medicines only as directed. And always read warning labels. Talk to your healthcare provider or pharmacist if you have questions.  NSAIDs  NSAIDs (nonsteroidal anti-inflammatory drugs) relieve pain. Some can be bought over the counter. These include aspirin, ibuprofen, and naproxen. Stronger NSAIDs may be prescribed for more severe symptoms.    Benefits. NSAIDs relieve headaches, muscle aches, and other physical symptoms. They work best when taken at the first sign of symptoms.    Side effects. NSAIDs can cause stomach upset. Taking them with food may help.    Other concerns. You shouldn t take more than one type of NSAID at a time. Also, NSAIDs may not be safe if you have asthma, heart disease, or kidney problems.  Birth control pills  Birth control pills may be prescribed to control hormone levels. When prescribed for PMS, pills may be taken every day for up  to 9 weeks.    Benefits. Birth control pills ease a range of symptoms including breast tenderness and appetite changes.    Side effects. Birth control pills can cause stomach upset and headache.    Other concerns. Rarely, birth control pills can cause blood clots. The risk is higher for cigarette smokers over age 35.  SSRIs  SSRIs (selective serotonin reuptake inhibitors) may be prescribed to help keep serotonin levels stable. SSRIs may be taken every day. Or, they may be taken only during the 2 weeks before your period.    Benefits. SSRIs help relieve emotional symptoms including irritability, depression, and mood changes. They also ease physical symptoms such as bloating, breast tenderness, and appetite changes.    Side effects. SSRIs can cause stomach upset, sleep problems, anxiety, and headache. SSRIs may also reduce interest in sex. This is less likely when SSRIs are taken only for part of the menstrual cycle.    Other concerns. SSRIs shouldn t be used if you take certain antidepressants.  GNRH agonists  GNRH agonists may be used in severe cases when SSRIs and birth control pills are ineffective or can't be tolerated    Benefits. GNRH agonists stop the normal hormonal cycle effectively causing a temporary menopause so the cyclic changes are no longer present.    Side effects. GNRH agonists stop menses, cause hot flashes and facundo loss unless given with add back treatment, which consists of continuous doses of estrogen and progestin.  Surgery  This consists of removing the ovaries and should be reserved for very severe cases that do not respond to medical treatment. This should only be considered after very careful consideration of the long-term effects and only after childbearing is complete.  Date Last Reviewed: 3/1/2017    0689-8685 [employer]. [address] [place] [address], [place] [address]. All rights reserved. This information is not intended as a substitute for professional medical care. Always  follow your healthcare professional's instructions.        PMS: Tracking Your Symptoms  Track your PMS symptoms to better understand when they may occur. Rank each symptom s severity from  worst  to  none.  Track any other symptoms in the blank rows. At the top of the chart, Mi'kmaq the days of your period. This will help identify the weeks of your cycle.    Date Last Reviewed: 10/1/2016    4758-7418 [employer]. [address] [place] [address]. All rights reserved. This information is not intended as a substitute for professional medical care. Always follow your healthcare professional's instructions.        PMS: Tracking Your Symptoms  Track your PMS symptoms to better understand when they may occur. Rank each symptom s severity from  worst  to  none.  Track any other symptoms in the blank rows. At the top of the chart, Mi'kmaq the days of your period. This will help identify the weeks of your cycle.    Date Last Reviewed: 10/1/2016    1891-6402 [employer]. [address] [place] [address]. All rights reserved. This information is not intended as a substitute for professional medical care. Always follow your healthcare professional's instructions.          PREVENTIVE HEALTH RECOMMENDATIONS:   Most women need a yearly breast and pelvic exam.    A PAP screen, a test done DURING a pelvic exam, is NO longer recommended yearly.    March 2013, screening guidelines recommended by ACOG for PAP screen are:    1) First pap at age 21.    2) Pap every 3 years until age 30.    3) After age 30, pap every 3 years or Pap with HR HPV screen every 5 years until age 65.  4) Women do NOT need a vaginal Pap screen after a hysterectomy (surgical removal of the uterus) when they have not had cancer.    Exceptions:  1) Yearly pap if HIV+ or immunosuppressed secondary to organ transplant  2) BELINDA II-III continue routine screening for 20 years.    I encourage you continue looking for  opportunities to choose a healthy lifestyle:       * Choose to eat a heart healthy diet. Check out the FOOD PLATE guidelines at: http://www.choosemyplate.gov/ for helpful hints on weight and cholesterol management.  Balance your caloric intake with exercise to maintain a BMI in the 22 to 26 range. For bone health: Eat calcium-rich foods like yogurt, broccoli or take chewable calcium pills (500 to 600 mg) twice a day with food.       * Exercise for at least an average of 30 minutes a day, 5 days of the week. This will help you control your weight, release stress, and help prevent disease.      * Take a Vitamin D3 supplement daily fall through spring and during summer unless you [id number]' full body sun exposure to skin without sunscreen.      * DO wear sunscreen to prevent skin cancer after the first 15-30 minutes.      * Identify stressors in your life, find ways to release the stress, and, make time for yourself. PLEASE ask for help if mood changes last longer than two weeks.     * Limit alcohol to one drink per day.  No smoking.  Avoid second hand smoke. If you smoke, ask for help to stop.       *  If you are in a sexual relationship, talk with your partner about possible infection risks and take action to protect yourself from exposure to a sexual infection.    Please request an infection screen for STIs (sexually transmitted infections) if you are less than age 26 OR believe that you may be at risk.     Get a flu shot each year. Get a tetanus shot every 10 years. EVERYONE needs a pertussis (Whooping cough) booster.    See your dentist twice a year for an exam and preventive care cleaning.     Consider the following screen tests:    1) cholesterol test every 5 years.     2) yearly mammogram after age 40 unless you have identified risks.    3) colonoscopy every 10 years after age 50 unless you have identified risks.    4) diabetes blood test screening if you are at risk for diabetes.      Additional information  that you may also find helpful:  The Internet now gives us access to LOTS of information -- some of it helpful, research documented and also plenty of harmful, anecdotal information that may not pertain to your situtaion. Consider visiting the following websites for accurate health information:    www.vitamindcouncil.org/ : Info and ongoing research re Vitamin D    www.fairview.org : Up to date and easily searchable information on multiple topics.    www.medlineplus.gov : medication info, interactive tutorials, watch real surgeries online    www.cdc.gov : public health info, travel advisories, epidemics (H1N1)    www.jatin/std.org: current research re diagnosis, treatment and prevention of sexually contacted infections.    www.health.[facility].[place] : [place] dept of heat, public health issues in [place], N1N1    www.familydoctor.org : good info from the Academy of Family Physicians      Return to clinic in 3 months for follow up  Recommend establish care with therapist  Start Rx, chart symptoms          Follow-ups after your visit        Additional Services     ACUPUNCTURE REFERRAL       Your provider [facility] you [facility] [phone]    Please be aware that coverage of these services is subject to the terms and limitations of your health insurance plan.  Call member services at your health plan with any benefit or coverage questions.      Please bring the following with you to your appointment:    (1) Any X-Rays, CTs or MRIs which have been performed.  Contact the facility where they were done to arrange for  prior to your scheduled appointment.  (2) List of current medications   (3) This referral request   (4) Any documents/labs given to you for this referral  Services Requested: MEDICAL: Consultation for Medical Management/Behavioral/Reconditioning Therapy    Please answer the following questions:    1. How long have you been treating this patient for this pain issue?      2  "week(s)    2. Have there been any of the following problematic behaviors?      Medication Management Issues: NO      Chemical Dependency: NO      Psychiatric History or Current Psych/Social Issues: YES  Bipolar requiring mood stabilizing medications, patient stopped in     3. Has the patient been to any other pain clinics and/or programs?      NO                  Follow-up notes from your care team     Return in 1 year (on 2019) for Preventative Health Visit.      Who to contact     Please call your clinic at [phone] to:    Ask questions about your health    Make or cancel appointments    Discuss your medicines    Learn about your test results    Speak to your doctor            Additional Information About Your Visit        [email or web address] Information     Event[employer] is an electronic gateway that provides easy, online access to your medical records. With Eventap, you can request a clinic appointment, read your test results, renew a prescription or communicate with your care team.     To sign up for [employer] visit the website at [email or web address]   You will be asked to enter the access code listed below, as well as some personal information. Please follow the directions to create your username and password.     Your access code is: [email or web address]  Expires: 7/3/2018  6:31 AM     Your access code will  in 90 days. If you need help or a new code, please contact your [facility] or call [phone] for assistance.        Care EveryWhere ID     This is your Care EveryWhere ID. This could be used by other organizations to access your [facility] medical records  [id number]        Your Vitals Were     Pulse Height BMI (Body Mass Index)             60 1.715 m (5' 7.5\") 22.53 kg/m2          Blood Pressure from Last 3 Encounters:   18 108/67    Weight from Last 3 Encounters:   18 66.2 kg (146 lb)              We Performed the Following     ACUPUNCTURE REFERRAL        "   Today's Medication Changes          These changes are accurate as of 4/4/18  9:31 AM.  If you have any questions, ask your nurse or doctor.               Start taking these medicines.        Dose/Directions    norethindrone 0.35 MG per tablet   Commonly known as:  MICRONOR   Used for:  Episodic mood disorder (H), PMDD (premenstrual dysphoric disorder)   Started by:  Marva Lan APRN CNP        Dose:  1 tablet   Take 1 tablet (0.35 mg) by mouth daily   Quantity:  28 tablet   Refills:  3         These medicines have changed or have updated prescriptions.        Dose/Directions    albuterol 108 (90 BASE) MCG/ACT Inhaler   Commonly known as:  PROAIR HFA/PROVENTIL HFA/VENTOLIN HFA   This may have changed:    - when to take this  - reasons to take this   Used for:  PMDD (premenstrual dysphoric disorder)   Changed by:  Marva Lan APRN CNP        Dose:  1-2 puff   Inhale 1-2 puffs into the lungs every 6 hours as needed for shortness of breath / dyspnea or wheezing   Quantity:  1 Inhaler   Refills:  1            Where to get your medicines      These medications were sent to [place] [id number] [place], [place] - [address] [facility]  [address] [facility] [address]     Phone:  [phone]     albuterol 108 (90 BASE) MCG/ACT Inhaler    norethindrone 0.35 MG per tablet                Primary Care Provider    None Specified       No primary provider on file.        Equal Access to Services     [facility]: Wang Blanco, ken villasenor, qarico kaaltano yap . So [facility] [phone].    ATENCIÓN: Si habla español, tiene a flores disposición servicios gratuitos de asistencia lingüística. Llame al [phone].    We comply with applicable federal civil rights laws and [place] laws. We do not discriminate on the basis of race, color, national origin, age, disability, sex, sexual orientation, or gender identity.            Thank you!      Thank you for choosing [facility]  for your care. Our goal is always to provide you with excellent care. Hearing back from our patients is one way we can continue to improve our services. Please take a few minutes to complete the written survey that you may receive in the mail after your visit with us. Thank you!             Your Updated Medication List - Protect others around you: Learn how to safely use, store and throw away your medicines at www.disposemymeds.org.          This list is accurate as of 4/4/18  9:31 AM.  Always use your most recent med list.                   Brand Name Dispense Instructions for use Diagnosis    albuterol 108 (90 BASE) MCG/ACT Inhaler    PROAIR HFA/PROVENTIL HFA/VENTOLIN HFA    1 Inhaler    Inhale 1-2 puffs into the lungs every 6 hours as needed for shortness of breath / dyspnea or wheezing    PMDD (premenstrual dysphoric disorder)       levonorgestrel 20 MCG/24HR IUD    MIRENA     1 Device by Intrauterine route 9/2014        norethindrone 0.35 MG per tablet    MICRONOR    28 tablet    Take 1 tablet (0.35 mg) by mouth daily    Episodic mood disorder (H), PMDD (premenstrual dysphoric disorder)

## 2018-04-05 ASSESSMENT — ASTHMA QUESTIONNAIRES: ACT_TOTALSCORE: 21

## 2018-04-05 ASSESSMENT — PATIENT HEALTH QUESTIONNAIRE - PHQ9: SUM OF ALL RESPONSES TO PHQ QUESTIONS 1-9: 6

## 2018-04-05 ASSESSMENT — ANXIETY QUESTIONNAIRES: GAD7 TOTAL SCORE: 1

## 2018-07-24 DIAGNOSIS — F32.81 PMDD (PREMENSTRUAL DYSPHORIC DISORDER): ICD-10-CM

## 2018-07-24 DIAGNOSIS — F39 EPISODIC MOOD DISORDER (H): ICD-10-CM

## 2018-07-24 RX ORDER — ACETAMINOPHEN AND CODEINE PHOSPHATE 120; 12 MG/5ML; MG/5ML
1 SOLUTION ORAL DAILY
Qty: 28 TABLET | Refills: 0 | Status: SHIPPED | OUTPATIENT
Start: 2018-07-24 | End: 2018-07-26

## 2018-07-26 ENCOUNTER — OFFICE VISIT (OUTPATIENT)
Dept: OBGYN | Facility: CLINIC | Age: 28
End: 2018-07-26
Attending: NURSE PRACTITIONER
Payer: COMMERCIAL

## 2018-07-26 VITALS
HEART RATE: 51 BPM | DIASTOLIC BLOOD PRESSURE: 72 MMHG | WEIGHT: 148 LBS | SYSTOLIC BLOOD PRESSURE: 113 MMHG | BODY MASS INDEX: 22.84 KG/M2

## 2018-07-26 DIAGNOSIS — F39 EPISODIC MOOD DISORDER (H): ICD-10-CM

## 2018-07-26 DIAGNOSIS — F32.81 PMDD (PREMENSTRUAL DYSPHORIC DISORDER): Primary | ICD-10-CM

## 2018-07-26 PROCEDURE — G0463 HOSPITAL OUTPT CLINIC VISIT: HCPCS | Mod: ZF

## 2018-07-26 RX ORDER — ACETAMINOPHEN AND CODEINE PHOSPHATE 120; 12 MG/5ML; MG/5ML
1 SOLUTION ORAL DAILY
Qty: 84 TABLET | Refills: 4 | Status: SHIPPED | OUTPATIENT
Start: 2018-07-26 | End: 2019-10-18

## 2018-07-26 ASSESSMENT — PAIN SCALES - GENERAL: PAINLEVEL: NO PAIN (0)

## 2018-07-26 NOTE — PROGRESS NOTES
SUBJECTIVE:  Yanni Mckenna is a 27y/o  who presents today for follow-up after initiating POPs in April to control her PMS symptoms.     Her PMS mood changes have improved greatly after starting the POPs. She has not had a period since she started the POPs, LMP was 3/30/2018. Did have a couple of days of spotting in mid May when she forgot to take her pill. She has the Mirena IUD in place for contraception that was placed in 2014. Yanni is very pleased with the POPs and wishes to continue use. Yanni was also encouraged to try Acupuncture and establish care with a psychologist. She has not done either of these. Yanni had questions about whether she could expect her PMS symptoms to return when she stops the POP and removes her Mirena IUD when she decides to try to conceive.  Symptoms were present prior to Mirena IUD insertion.     Of note, patient has a hx of Bipolar disorder. She stopped all medications in .    History of abnormal pap smear in . Last pap smear in summer 2016 was normal.    Past Medical History:   Diagnosis Date     Asthma    Bipolar disorder    Past Surgical History:   Procedure Laterality Date     NO HISTORY OF SURGERY         OBJECTIVE:  /72  Pulse 51  Wt 67.1 kg (148 lb)  Breastfeeding? No  BMI 22.84 kg/m2  General: Well appearing pleasant woman in no acute distress.    ASSESSMENT:  Encounter Diagnoses   Name Primary?     PMDD (premenstrual dysphoric disorder) Yes     Episodic mood disorder (H)        PLAN:  1. Refill sent for year supply of POPs.  2. Discussed possibility of cyclic mood changes if and when she decides to stop POPs and desires to conceive. Options for management of return of mood changes include mental health medication, acupuncture, and psychotherapy.  3. Interested in trying acupuncture for mood.  Referral order placed and patient was provided with the phone number to make an appointment. Offered appointment with therapist, she declined at this time  but will connect via Novastt if she decides she changes her mind.  4. Offered annual exam today; patient declined; encouraged yearly preventative health visits.  Pap due in Summer 2019.  5. Mirena IUD due to be removed/replaced in August 2019.  6. Return to clinic annually or earlier as needed.    Yanni verbalized understanding and agreement with the plan of care.    I, Bijal Black, completed the PFSH and ROS. I then acted as a scribe for MELODIE Padilla, for the remainder of the visit.  Bijal Black, BSN, BELLE Student    I agree with the PFSH and ROS as completed by the MELODIE Student, except for changes made by me.  The remainder of the encounter was performed by me and scribed by the MELODIE Student.  The scribed note accurately reflects my personal services and decisions made by me.  Susana Rojas DNP, APRN, MELODIE

## 2018-07-26 NOTE — MR AVS SNAPSHOT
After Visit Summary   7/26/2018    Yanni Mckenna    MRN: [id number]           Patient Information     Date Of Birth          1990        Visit Information        Provider Department      7/26/2018 8:00 AM Susana Rojas APRN [facility]        Today's Diagnoses     PMDD (premenstrual dysphoric disorder)    -  1    Episodic mood disorder (H)           Follow-ups after your visit        Additional Services     ACUPUNCTURE REFERRAL       Your provider [facility] you [phone]    Please be aware that coverage of these services is subject to the terms and limitations of your health insurance plan.  Call member services at your health plan with any benefit or coverage questions.      Please bring the following with you to your appointment:    (1) Any X-Rays, CTs or MRIs which have been performed.  Contact the facility where they were done to arrange for  prior to your scheduled appointment.  (2) List of current medications   (3) This referral request   (4) Any documents/labs given to you for this referral  Services Requested: Acupuncture for PMS/ PMDD    Please answer the following questions:    1. How long have you been treating this patient for this pain issue?      Since menarche    2. Have there been any of the following problematic behaviors?      Medication Management Issues: NO      Chemical Dependency: NO      Psychiatric History or Current Psych/Social Issues: YES, hx of bipolar disorder, stopped all medications in 2015    3. Has the patient been to any other pain clinics and/or programs?      NO                  Who to contact     Please call your clinic at [phone] to:    Ask questions about your health    Make or cancel appointments    Discuss your medicines    Learn about your test results    Speak to your doctor            Additional Information About Your Visit        Jesushart       SocialGO gives you secure access to your electronic health record. If you see a primary care provider, you can also send messages to your care team and make appointments. If you have questions, please call your primary care clinic.  If you do not have a primary care provider, please call [phone] and they will assist you.      SocialGO is an electronic gateway that provides easy, online access to your medical records. With SocialGO, you can request a clinic appointment, read your test results, renew a prescription or communicate with your care team.     To access your existing account, please contact your [facility] or call [phone] for assistance.        Care EveryWhere ID     This is your Care EveryWhere ID. This could be used by other organizations to access your [facility] medical records  [id number]        Your Vitals Were     Pulse Breastfeeding? BMI (Body Mass Index)             51 No 22.84 kg/m2          Blood Pressure from Last 3 Encounters:   07/26/18 113/72   04/04/18 108/67    Weight from Last 3 Encounters:   07/26/18 67.1 kg (148 lb)   04/04/18 66.2 kg (146 lb)              We Performed the Following     ACUPUNCTURE REFERRAL          Where to get your medicines      These medications were sent to [facility] [address] [place], [place] - [address]  [address] [address]     Phone:  [phone]     norethindrone 0.35 MG per tablet          Primary Care Provider    None Specified       No primary provider on file.        Equal Access to Services     [place] ART : Hadii yuliana Blanco, wacynthiada luqadaha, qaybta kaalmada tano french. So [facility] [phone].    ATENCIÓN: Si habla español, tiene a flores disposición servicios gratuitos de asistencia lingüística. Llame al [phone].    We comply with applicable federal civil rights laws and [place] laws. We do not discriminate on the basis of  race, color, national origin, age, disability, sex, sexual orientation, or gender identity.            Thank you!     Thank you for choosing [facility]  for your care. Our goal is always to provide you with excellent care. Hearing back from our patients is one way we can continue to improve our services. Please take a few minutes to complete the written survey that you may receive in the mail after your visit with us. Thank you!             Your Updated Medication List - Protect others around you: Learn how to safely use, store and throw away your medicines at www.disposemymeds.org.          This list is accurate as of 7/26/18 11:50 AM.  Always use your most recent med list.                   Brand Name Dispense Instructions for use Diagnosis    albuterol 108 (90 Base) MCG/ACT Inhaler    PROAIR HFA/PROVENTIL HFA/VENTOLIN HFA    1 Inhaler    Inhale 1-2 puffs into the lungs every 6 hours as needed for shortness of breath / dyspnea or wheezing    PMDD (premenstrual dysphoric disorder)       levonorgestrel 20 MCG/24HR IUD    MIRENA     1 Device by Intrauterine route 9/2014        norethindrone 0.35 MG per tablet    MICRONOR    84 tablet    Take 1 tablet (0.35 mg) by mouth daily    Episodic mood disorder (H), PMDD (premenstrual dysphoric disorder)

## 2018-07-26 NOTE — NURSING NOTE
Chief Complaint   Patient presents with     RECHECK     Follow-up birth control pills   Jeanie Melissa LPN

## 2018-07-26 NOTE — LETTER
2018       RE: Yanni Mckenna  [address] [address]     Dear Colleague,    Thank you for referring your patient, Yanni Mckenna, to the [facility] at [facility]. Please see a copy of my visit note below.    SUBJECTIVE:  Yanni Mckenna is a 27y/o  who presents today for follow-up after initiating POPs in April to control her PMS symptoms.     Her PMS mood changes have improved greatly after starting the POPs. She has not had a period since she started the POPs, LMP was 3/30/2018. Did have a couple of days of spotting in mid May when she forgot to take her pill. She has the Mirena IUD in place for contraception that was placed in 2014. Yanni is very pleased with the POPs and wishes to continue use. Yanni was also encouraged to try Acupuncture and establish care with a psychologist. She has not done either of these. Yanni had questions about whether she could expect her PMS symptoms to return when she stops the POP and removes her Mirena IUD when she decides to try to conceive.  Symptoms were present prior to Mirena IUD insertion.     Of note, patient has a hx of Bipolar disorder. She stopped all medications in .    History of abnormal pap smear in . Last pap smear in summer 2016 was normal.    Past Medical History:   Diagnosis Date     Asthma    Bipolar disorder    Past Surgical History:   Procedure Laterality Date     NO HISTORY OF SURGERY         OBJECTIVE:  /72  Pulse 51  Wt 67.1 kg (148 lb)  Breastfeeding? No  BMI 22.84 kg/m2  General: Well appearing pleasant woman in no acute distress.    ASSESSMENT:  Encounter Diagnoses   Name Primary?     PMDD (premenstrual dysphoric disorder) Yes     Episodic mood disorder (H)        PLAN:  1. Refill sent for year supply of POPs.  2. Discussed possibility of cyclic mood changes if and when she decides to stop POPs and desires to conceive. Options for management of return  of mood changes include mental health medication, acupuncture, and psychotherapy.  3. Interested in trying acupuncture for mood.  Referral order placed and patient was provided with the phone number to make an appointment. Offered appointment with therapist, she declined at this time but will connect via QR Wild if she decides she changes her mind.  4. Offered annual exam today; patient declined; encouraged yearly preventative health visits.  Pap due in Summer 2019.  5. Mirena IUD due to be removed/replaced in August 2019.  6. Return to clinic annually or earlier as needed.    Yanni verbalized understanding and agreement with the plan of care.    I, Bijal Black, completed the PFSH and ROS. I then acted as a scribe for MELODIE Padilla, for the remainder of the visit.  Bijal Black, YAON, BELLE Student    I agree with the PFSH and ROS as completed by the MELODIE Student, except for changes made by me.  The remainder of the encounter was performed by me and scribed by the MELODIE Student.  The scribed note accurately reflects my personal services and decisions made by me.  Susana Rojas DNP, CHIOMA, MELODIE        Again, thank you for allowing me to participate in the care of your patient.      Sincerely,    CHIOMA Esquivel CNP

## 2018-07-26 NOTE — LETTER
Date:July 27, 2018      Patient was self referred, no letter generated. Do not send.        [facility] Health Information

## 2018-08-01 ENCOUNTER — HEALTH MAINTENANCE LETTER (OUTPATIENT)
Age: 28
End: 2018-08-01

## 2018-08-15 ENCOUNTER — OFFICE VISIT (OUTPATIENT)
Dept: OTHER | Facility: CLINIC | Age: 28
End: 2018-08-15
Payer: COMMERCIAL

## 2018-08-15 DIAGNOSIS — F39 EPISODIC MOOD DISORDER (H): Primary | ICD-10-CM

## 2018-08-15 DIAGNOSIS — G89.29 CHRONIC NONINTRACTABLE HEADACHE, UNSPECIFIED HEADACHE TYPE: ICD-10-CM

## 2018-08-15 DIAGNOSIS — R51.9 CHRONIC NONINTRACTABLE HEADACHE, UNSPECIFIED HEADACHE TYPE: ICD-10-CM

## 2018-08-15 NOTE — PROGRESS NOTES
"Ms. Mckenna, is a 28 year old female is here today for initial acupuncture treatment for main complaint of  Depression and anxiety, mixed mood disorder. Patient is also seeking acupuncture for intermittent headaches located in the frontal area, that have been increasing in frequency and severity in the past month. Headaches \"may be\" connected to monthly menstrual cycle, but no clear pattern has developed.  Emotional imbalance does follows cycle, mood changes very quickly and with intense irritability and anger that feel out of control.   Started birth control pills in addition to Meravia IUD 4 months ago- low dose progestrone, does report possibly feeling slightly less intensity of mood swings and more in control.   Patient reports, Lots of stress with job and living high intensity lifestyle.   Yoga, meditation- trying to implement more into life. Patient reports was on a varity of mood stabilzers from age 16 until 2015 is now Off of all medications other then BCP.    Patient is referred by: Self      Secondary Complaints: restless sleep, PMS, mood swings     Observation: no concerns       [facility] Medical Diagnosis Pertinent to Treatment:Liver Qi Stagnation       Treatment Principle: Move Liver qi     Points Used Today:    Initial Point Insertions: Li4, L3, L8, Gb20, Anmian, Gb21, Li11, Si6, Lv8, St36, Sp6, Yintang      Additional Point Insertions: none     Needles stimulated,retained and removed.    Accessory Technique's:    TDP Heat Lamp: On Feet and Abdomen   Essential Oil(s): Lavender     Acupuncture Treatment Recommendations and Comments: follow up treatment to assess for changes as soon as possible.        Past Medical History:   has a past medical history of Asthma.    Energy: low to moderate  Sleep: easy to fall asleep, good sleep normally - when stressed   Emotions: irritability   Limbs/Back: NA  Head/Eyes/Ears: reports increased headaches in the frontal area   Digestion: NA  Stools: normal   Tongue: greasy " yellow coat   Pulse: wiry   Palpitation:     Plan:It is my recommendation that patient seek advice from their PCP about active symptoms not addressed during this visit. Addressed all patient's questions to their satisfaction and was given consent to treat.      Patient understands that acupuncture is not a guarantee of benefits and will verify with own insurance and/or estefanía specialists. The risks and benefits of acupuncture were reviewed and the patient stated understanding. Answered all patient's questions to their satisfaction. Scope of practice and the acupuncturist's qualifications were also reviewed the patient provided signed consent.     CPT codes associated with this visit: 77809   Time Spent with Patient:       I spent a total of 50 minutes face-to-face with Yanni Bala during today's office visit.  Over 50% of this time was spent counseling the patient and/or coordinating care regarding No chief complaint on file.   .  See note for details.        Meli Sarmiento L.Ac MSOM

## 2018-08-15 NOTE — MR AVS SNAPSHOT
After Visit Summary   8/15/2018    Yanni Mckenna    MRN: [id number]           Patient Information     Date Of Birth          1990        Visit Information        Provider Department      8/15/2018 3:00 PM Meli Sarmiento [facility] Acupuncture        Today's Diagnoses     Episodic mood disorder (H)    -  1    Chronic nonintractable headache, unspecified headache type           Follow-ups after your visit        Follow-up notes from your care team     Return for Acupuncture.      Who to contact     Please call your clinic at [phone] to:    Ask questions about your health    Make or cancel appointments    Discuss your medicines    Learn about your test results    Speak to your doctor            Additional Information About Your Visit        [email or web address] Information     FIRE1 gives you secure access to your electronic health record. If you see a primary care provider, you can also send messages to your care team and make appointments. If you have questions, please call your primary care clinic.  If you do not have a primary care provider, please call [phone] and they will assist you.      FIRE1 is an electronic gateway that provides easy, online access to your medical records. With FIRE1, you can request a clinic appointment, read your test results, renew a prescription or communicate with your care team.     To access your existing account, please contact your [facility] or call [phone] for assistance.        Care EveryWhere ID     This is your Care EveryWhere ID. This could be used by other organizations to access your [facility] medical records  [id number]         Blood Pressure from Last 3 Encounters:   07/26/18 113/72   04/04/18 108/67    Weight from Last 3 Encounters:   07/26/18 148 lb (67.1 kg)   04/04/18 146 lb (66.2 kg)              We Performed the Following     ACUPUNCT W/O ELEC STIMUL ADDL 15M     ACUPUNCTURE, 1+ NEEDLES, W/O ELECTRICAL STIM; INIT  15 MIN PERSONAL CONTACT        Primary Care Provider    None Specified       No primary provider on file.        Equal Access to Services     [place] ART : Hadii aad ku hadkaylenjacquelyn Blanco, ken villasenor, jose jmelissa moranmatano obregonlaurengeovanna colon. So [facility] [phone].    ATENCIÓN: Si habla español, tiene a flores disposición servicios gratuitos de asistencia lingüística. Llame al [phone].    We comply with applicable federal civil rights laws and [place] laws. We do not discriminate on the basis of race, color, national origin, age, disability, sex, sexual orientation, or gender identity.            Thank you!     Thank you for choosing [facility]  for your care. Our goal is always to provide you with excellent care. Hearing back from our patients is one way we can continue to improve our services. Please take a few minutes to complete the written survey that you may receive in the mail after your visit with us. Thank you!             Your Updated Medication List - Protect others around you: Learn how to safely use, store and throw away your medicines at www.disposemymeds.org.          This list is accurate as of 8/15/18  5:48 PM.  Always use your most recent med list.                   Brand Name Dispense Instructions for use Diagnosis    albuterol 108 (90 Base) MCG/ACT inhaler    PROAIR HFA/PROVENTIL HFA/VENTOLIN HFA    1 Inhaler    Inhale 1-2 puffs into the lungs every 6 hours as needed for shortness of breath / dyspnea or wheezing    PMDD (premenstrual dysphoric disorder)       levonorgestrel 20 MCG/24HR IUD    MIRENA     1 Device by Intrauterine route 9/2014        norethindrone 0.35 MG per tablet    MICRONOR    84 tablet    Take 1 tablet (0.35 mg) by mouth daily    Episodic mood disorder (H), PMDD (premenstrual dysphoric disorder)

## 2018-10-04 ENCOUNTER — OFFICE VISIT (OUTPATIENT)
Dept: OTHER | Facility: CLINIC | Age: 28
End: 2018-10-04
Payer: COMMERCIAL

## 2018-10-04 DIAGNOSIS — M54.9 BACK PAIN: ICD-10-CM

## 2018-10-04 DIAGNOSIS — M54.2 NECK PAIN: ICD-10-CM

## 2018-10-04 DIAGNOSIS — R51.9 HEADACHE: Primary | ICD-10-CM

## 2018-10-04 NOTE — MR AVS SNAPSHOT
After Visit Summary   10/4/2018    Yanni Mckenna    MRN: [id number]           Patient Information     Date Of Birth          1990        Visit Information        Provider Department      10/4/2018 1:00 PM Meli Sarmiento [facility] Acupuncture        Today's Diagnoses     Headache    -  1    Neck pain        Back pain           Follow-ups after your visit        Follow-up notes from your care team     Return in about 1 week (around 10/11/2018) for Acupuncture.      Your next 10 appointments already scheduled     Oct 11, 2018  1:00 PM CDT   (Arrive by 12:45 PM)   Return Visit with Melibethel Sarmiento   [facility]upuncture ([facility])    [address] [id number] [phone]            Oct 18, 2018  1:00 PM CDT   (Arrive by 12:45 PM)   Return Visit with Meli Guillermina   [facility] Acupuncture ([facility])    [address] [place] [id number] [phone]            Oct 25, 2018  1:00 PM CDT   (Arrive by 12:45 PM)   Return Visit with Melijuan manuel Sarmiento   [facility] Acupuncture ([facility])    [address] [place] [id number] [phone]              Who to contact     Please call your clinic at [phone] to:    Ask questions about your health    Make or cancel appointments    Discuss your medicines    Learn about your test results    Speak to your doctor            Additional Information About Your Visit        MyChart Information     EcoVadist gives you secure access to your electronic health record. If you see a primary care provider, you can also send messages to your care team and make appointments. If you have questions, please call your primary care clinic.  If you do not have a primary care provider, please call [phone] and they will assist you.      Pedius is an electronic gateway that provides easy, online access to your medical records. With  Drake, you can request a clinic appointment, read your test results, renew a prescription or communicate with your care team.     To access your existing account, please contact your [facility] or call [phone] for assistance.        Care EveryWhere ID     This is your Care EveryWhere ID. This could be used by other organizations to access your [place] medical records  [id number]         Blood Pressure from Last 3 Encounters:   07/26/18 113/72   04/04/18 108/67    Weight from Last 3 Encounters:   07/26/18 148 lb (67.1 kg)   04/04/18 146 lb (66.2 kg)              We Performed the Following     ACUPUNCT W/O ELEC STIMUL ADDL 15M     ACUPUNCTURE, 1+ NEEDLES, W/O ELECTRICAL STIM; INIT 15 MIN PERSONAL CONTACT        Primary Care Provider    None Specified       No primary provider on file.        Equal Access to Services     [facility] [facility] : Wang Blanco, waaxmary hollowayqadaha, heideybmelissa kaalmamary french, tano deleon . So [facility] [phone].    ATENCIÓN: Si habla español, tiene a flores disposición servicios gratuitos de asistencia lingüística. Kename al [phone].    We comply with applicable federal civil rights laws and [place] laws. We do not discriminate on the basis of race, color, national origin, age, disability, sex, sexual orientation, or gender identity.            Thank you!     Thank you for choosing [facility]  for your care. Our goal is always to provide you with excellent care. Hearing back from our patients is one way we can continue to improve our services. Please take a few minutes to complete the written survey that you may receive in the mail after your visit with us. Thank you!             Your Updated Medication List - Protect others around you: Learn how to safely use, store and throw away your medicines at www.disposemymeds.org.          This list is accurate as of 10/4/18  1:37 PM.  Always use your most recent  med list.                   Brand Name Dispense Instructions for use Diagnosis    albuterol 108 (90 Base) MCG/ACT inhaler    PROAIR HFA/PROVENTIL HFA/VENTOLIN HFA    1 Inhaler    Inhale 1-2 puffs into the lungs every 6 hours as needed for shortness of breath / dyspnea or wheezing    PMDD (premenstrual dysphoric disorder)       levonorgestrel 20 MCG/24HR IUD    MIRENA     1 Device by Intrauterine route 9/2014        norethindrone 0.35 MG per tablet    MICRONOR    84 tablet    Take 1 tablet (0.35 mg) by mouth daily    Episodic mood disorder (H), PMDD (premenstrual dysphoric disorder)

## 2018-10-04 NOTE — PROGRESS NOTES
Ms. Mckenna, is a 28 year old female is here today for a Follow Up acupuncture treatment for main complaint of Headache and Pain       Patient is referred by: Self    Report of Findings from Previous Treatment: patient reports increase in frontal and temporal headaches, HA today 5/10 deep dull ache. Reports frequency - 4-5 x per week. Pain has has been mild bu constant in cervical region radiating to mid back and shoulders. Increases with stress. Reports mood has stabilized and reports feeling less irritable and angry with boyfriend.       Secondary Complaints: No further complaints     Observation: patient is friendly and engaged.       Eastern Medical Diagnosis Pertinent to Treatment:Liver Qi Stagnation       Treatment Principle: Move and sooth Liver     Points Used Today:    Initial Point Insertions: 4 wasserman, Lv8, Sp6, Lu7, Kd6, Yintang, Taiyang, Gb13, Anmina, Gb20, 21     Additional Point Insertions: Conesus of shoulder, Li11 (reduce heat) Si6, (L) p6 (R) sp4      Needles stimulated,retained and removed.    Accessory Technique's:   TDP Heat Lamp: On Feet and Abdomen   Essential Oil(s): Lavender     Acupuncture Treatment Recommendations and Comments: follow in in 1 week, acupuncture 1 x per week for 4 weeks - assess for progress        Past Medical History:   has a past medical history of Asthma.      Energy: Medium  Sleep: No concerns  Emotions: Yes: Anger/Irritability  Limbs/Back: Refer to Notes  Head/Eyes/Ears: Refer to Notes  Digestion: no current symptoms  Stools: Normal  Tongue: red body, glossy   Pulse: rapid, wiry   Palpitation: NA  Women's Health: on BC, does not get period     Plan: It is my recommendation that patient seek advice from their PCP about active symptoms not addressed during this visit. Addressed all patient's questions to their satisfaction and was given consent to treat.      Patient understands that acupuncture is not a guarantee of benefits and will verify with own insurance and/or estefanía  specialists. The risks and benefits of acupuncture were reviewed and the patient stated understanding. Answered all patient's questions to their satisfaction. Scope of practice and the acupuncturist's qualifications were also reviewed the patient provided signed consent.     CPT codes associated with this visit: 33129, 80622   Time Spent with Patient:        I spent a total of 60 minutes face-to-face with Yanni Mckenna during today's office visit.  Over 50% of this time was spent counseling the patient and/or coordinating care regarding   Chief Complaint   Patient presents with     Headache     Pain   .  See note for details.            Meli Sarmiento L.Ac MSOM

## 2018-11-06 ENCOUNTER — OFFICE VISIT (OUTPATIENT)
Dept: OTHER | Facility: CLINIC | Age: 28
End: 2018-11-06
Payer: COMMERCIAL

## 2018-11-06 DIAGNOSIS — M54.2 NECK PAIN: ICD-10-CM

## 2018-11-06 DIAGNOSIS — R51.9 HEADACHE: Primary | ICD-10-CM

## 2018-11-06 NOTE — PROGRESS NOTES
Ms. Mckenna, is a 28 year old female is here today for a Follow Up acupuncture treatment for main complaint of Headaches, neck pain ,pain and fatigue.    Patient is referred by: Self    Report of Findings from Previous Treatment: patient reports decrease in frontal and temporal headaches, no report HA today and all s/s have been absent for 3 weeks. Report. Neck Pain has has been mild but constant in cervical region radiating to mid back and shoulders. Increases with stress. Reports mood has stabilized and reports feeling less irritable and angry with boyfriend.       Secondary Complaints: abdominal cramping, increased stress with work      Observation: patient is friendly and engaged.         Eastern Medical Diagnosis Pertinent to Treatment:Liver Qi Stagnation         Treatment Principle: Move and sooth Liver      Points Used Today:                         Initial Point Insertions: 4 wasserman, Lv8, Sp6, Lu7, Kd6, Yintang, Taiyang, Gb13, Anmian, Gb20, 21, st36, Kd16, Ren3, Diaphragm MPx 2                           Additional Point Insertions: Lafe of shoulder, Li11 (reduce heat) Si6, (L) p6 (R) sp4        Needles stimulated,retained and removed.     Accessory Technique's:                        TDP Heat Lamp: On Feet and Abdomen                        Essential Oil(s): Lavender      Acupuncture Treatment Recommendations and Comments: follow in in 1 week, acupuncture 1 x per week for 4 weeks - assess for progress          Past Medical History:   has a past medical history of Asthma.        Energy: Medium  Sleep: No concerns  Emotions: Yes: Anger/Irritability  Limbs/Back: Refer to Notes  Head/Eyes/Ears: Refer to Notes  Digestion: no current symptoms  Stools: Normal  Tongue: red body, glossy   Pulse: rapid, wiry   Palpitation: NA  Women's Health: on BC, does not get period           Plan: It is my recommendation that patient seek advice from their PCP about active symptoms not addressed during this visit. Addressed all  patient's questions to their satisfaction and was given consent to treat.      Patient understands that acupuncture is not a guarantee of benefits and will verify with own insurance and/or estefanía specialists. The risks and benefits of acupuncture were reviewed and the patient stated understanding. Answered all patient's questions to their satisfaction. Scope of practice and the acupuncturist's qualifications were also reviewed the patient provided signed consent.     CPT codes associated with this visit: 67799, 29685  Time Spent with Patient:        I spent a total of 60 minutes face-to-face with Yanni Mckenna during today's office visit.  Over 50% of this time was spent counseling the patient and/or coordinating care regarding No chief complaint on file.  .  See note for details.            Meli Sarmiento L.Ac MSOM

## 2018-11-06 NOTE — MR AVS SNAPSHOT
After Visit Summary   11/6/2018    Yanni Mckenna    MRN: [id number]           Patient Information     Date Of Birth          1990        Visit Information        Provider Department      11/6/2018 9:00 AM Meli Sarmiento [facility] Acupuncture        Today's Diagnoses     Headache    -  1    Neck pain           Follow-ups after your visit        Follow-up notes from your care team     Return for Acupuncture.      Who to contact     Please call your clinic at [phone] to:    Ask questions about your health    Make or cancel appointments    Discuss your medicines    Learn about your test results    Speak to your doctor            Additional Information About Your Visit        MyChart Information     [employer] gives you secure access to your electronic health record. If you see a primary care provider, you can also send messages to your care team and make appointments. If you have questions, please call your primary care clinic.  If you do not have a primary care provider, please call [phone] and they will assist you.      [employer] is an electronic gateway that provides easy, online access to your medical records. With [employer], you can request a clinic appointment, read your test results, renew a prescription or communicate with your care team.     To access your existing account, please contact your [facility] or call [phone] for assistance.        Care EveryWhere ID     This is your Care EveryWhere ID. This could be used by other organizations to access your [place] medical records  [id number]         Blood Pressure from Last 3 Encounters:   07/26/18 113/72   04/04/18 108/67    Weight from Last 3 Encounters:   07/26/18 148 lb (67.1 kg)   04/04/18 146 lb (66.2 kg)              We Performed the Following     ACUPUNCT W/O ELEC STIMUL ADDL 15M     ACUPUNCTURE, 1+ NEEDLES, W/O ELECTRICAL STIM; INIT 15 MIN PERSONAL CONTACT        Primary Care Provider    None  Specified       No primary provider on file.        Equal Access to Services     [facility] : Hadii aad ku hadkaylenjacquelyn Blanco, mekamary villasenor, jose jmelissa moranmatano obregon. So [facility] [phone].    ATENCIÓN: Si habla español, tiene a flores disposición servicios gratuitos de asistencia lingüística. Kename al [phone].    We comply with applicable federal civil rights laws and [place] laws. We do not discriminate on the basis of race, color, national origin, age, disability, sex, sexual orientation, or gender identity.            Thank you!     Thank you for choosing [facility]  for your care. Our goal is always to provide you with excellent care. Hearing back from our patients is one way we can continue to improve our services. Please take a few minutes to complete the written survey that you may receive in the mail after your visit with us. Thank you!             Your Updated Medication List - Protect others around you: Learn how to safely use, store and throw away your medicines at www.disposemymeds.org.          This list is accurate as of 11/6/18  9:41 AM.  Always use your most recent med list.                   Brand Name Dispense Instructions for use Diagnosis    albuterol 108 (90 Base) MCG/ACT inhaler    PROAIR HFA/PROVENTIL HFA/VENTOLIN HFA    1 Inhaler    Inhale 1-2 puffs into the lungs every 6 hours as needed for shortness of breath / dyspnea or wheezing    PMDD (premenstrual dysphoric disorder)       levonorgestrel 20 MCG/24HR IUD    MIRENA     1 Device by Intrauterine route 9/2014        norethindrone 0.35 MG per tablet    MICRONOR    84 tablet    Take 1 tablet (0.35 mg) by mouth daily    Episodic mood disorder (H), PMDD (premenstrual dysphoric disorder)

## 2018-11-08 ENCOUNTER — MYC REFILL (OUTPATIENT)
Dept: OBGYN | Facility: CLINIC | Age: 28
End: 2018-11-08

## 2018-11-08 DIAGNOSIS — F32.81 PMDD (PREMENSTRUAL DYSPHORIC DISORDER): ICD-10-CM

## 2018-11-28 RX ORDER — ALBUTEROL SULFATE 90 UG/1
1-2 AEROSOL, METERED RESPIRATORY (INHALATION) EVERY 6 HOURS PRN
Qty: 1 INHALER | Refills: 1 | Status: SHIPPED | OUTPATIENT
Start: 2018-11-28 | End: 2019-07-08

## 2018-11-28 NOTE — TELEPHONE ENCOUNTER
Message from [facility]:   From: JARAD MCKENNA   Sent: Sun Nov 25, 2018 10:07 PM   To: s [facility]  Subject: RE: Medication Renewal Request  Deondre Mcgowan,    Thank you, I was able to get the birth control prescription refilled. I am still waiting on the refill for the inhaler.    Thank you,  Jarad  ----- Message -----  From: Roslyn CHAVEZ  Sent: 11/12/2018 2:50 PM CST  To: Jarad Mckenna  Subject: RE: Medication Renewal Request    Deondre Liao, A year's worth of your birth control was sent to [facility] back in July. Please contact them to refill the prescription that we sent in July.    -MI Mcgowan [facility]    ----- Message -----   From: Jarad Mckenna   Sent: 11/8/2018 12:19 PM CST   To: Marva MORENO  Subject: Medication Renewal Request    Original authorizing provider: CHIOMA Gomez CNP would like a refill of the following medications:  albuterol (PROAIR HFA/PROVENTIL HFA/VENTOLIN HFA) 108 (90 BASE) MCG/ACT Inhaler [CHIOMA Gomez CNP]    Preferred pharmacy: [address]    Comment:      Medication renewals requested in this message routed to other providers:  norethindrone (MICRONOR) 0.35 MG per tablet [CHIOMA Esquivel CNP]

## 2019-02-26 ENCOUNTER — OFFICE VISIT (OUTPATIENT)
Dept: FAMILY MEDICINE | Facility: CLINIC | Age: 29
End: 2019-02-26
Payer: COMMERCIAL

## 2019-02-26 VITALS
DIASTOLIC BLOOD PRESSURE: 72 MMHG | HEART RATE: 48 BPM | SYSTOLIC BLOOD PRESSURE: 110 MMHG | OXYGEN SATURATION: 98 % | TEMPERATURE: 98.3 F | RESPIRATION RATE: 14 BRPM | HEIGHT: 68 IN | BODY MASS INDEX: 23.01 KG/M2 | WEIGHT: 151.8 LBS

## 2019-02-26 DIAGNOSIS — F31.9 BIPOLAR 1 DISORDER (H): Primary | ICD-10-CM

## 2019-02-26 DIAGNOSIS — Z78.9 HISTORY OF RECENT AIR TRAVEL: ICD-10-CM

## 2019-02-26 PROCEDURE — 99203 OFFICE O/P NEW LOW 30 MIN: CPT | Performed by: NURSE PRACTITIONER

## 2019-02-26 RX ORDER — ZOLPIDEM TARTRATE 5 MG/1
5 TABLET ORAL
Qty: 4 TABLET | Refills: 0 | Status: SHIPPED | OUTPATIENT
Start: 2019-02-26 | End: 2019-08-25

## 2019-02-26 ASSESSMENT — PAIN SCALES - GENERAL: PAINLEVEL: NO PAIN (0)

## 2019-02-26 ASSESSMENT — MIFFLIN-ST. JEOR: SCORE: 1469.44

## 2019-02-26 ASSESSMENT — PATIENT HEALTH QUESTIONNAIRE - PHQ9: SUM OF ALL RESPONSES TO PHQ QUESTIONS 1-9: 13

## 2019-02-26 NOTE — LETTER
February 26, 2019      Yanni Mckenna  [address] [address]        To Whom It May Concern:    Yanni Mckenna  was seen on 2/26/2019.  She was prescribed Ambien 5 mg, 4 tablets to bring with her on the flight to [facility] to assist her to sleep while she travels.  Please contact me with any questions or concerns.      Sincerely,        CHIOMA Villatoro CNP

## 2019-02-26 NOTE — PROGRESS NOTES
"  SUBJECTIVE:   Yanni Mckenna is a 28 year old female who presents to clinic today for the following health issues:  Depression and Anxiety Follow-Up      Diagnosed with Bipolar 1 in High school (2007), and first started on Topamax, has been on multiple mood stabilizing drugs since that time; stopped all medications in 2015 and has used acupuncture and yoga to manage mood symptoms    Status since last visit: up and down    Other associated symptoms:mood swings    Complicating factors: manages a store, stress level generally stable    Significant life event: No     Current substance abuse: None      Will be flying to [facility] [place] in 4 weeks. Gets flight anxiety and has found what works best for her is to take a dose of ambien and to fall asleep during the flight. She does not drink alcohol or do street drugs. She will be with her partner on the flight to [facility].     PHQ 4/4/2018   PHQ-9 Total Score 6   Q9: Suicide Ideation Not at all     TIMOTHY-7 SCORE 4/4/2018   Total Score 1       PHQ-9  English  PHQ-9   Any Language  TIMOTHY-7  Suicide Assessment Five-step Evaluation and Treatment (SAFE-T)    Problem list and histories reviewed & adjusted, as indicated.  Additional history: as documented    BP Readings from Last 3 Encounters:   02/26/19 110/72   07/26/18 113/72   04/04/18 108/67    Wt Readings from Last 3 Encounters:   02/26/19 68.9 kg (151 lb 12.8 oz)   07/26/18 67.1 kg (148 lb)   04/04/18 66.2 kg (146 lb)                  Labs reviewed in EPIC    Reviewed and updated as needed this visit by clinical staff  Tobacco  Allergies  Meds  Med Hx  Surg Hx  Fam Hx  Soc Hx      Reviewed and updated as needed this visit by Provider         ROS:  Constitutional, HEENT, cardiovascular, pulmonary, GI, , musculoskeletal, neuro, skin, endocrine and psych systems are negative, except as otherwise noted.    OBJECTIVE:     /72   Pulse (!) 48   Temp 98.3  F (36.8  C) (Temporal)   Resp 14   Ht 1.739 m (5' 8.47\")   Wt 68.9 " kg (151 lb 12.8 oz)   LMP 04/22/2018 (Exact Date)   SpO2 98%   BMI 22.77 kg/m    Body mass index is 22.77 kg/m .   GENERAL: healthy, alert and no distress  NECK: no adenopathy, no asymmetry, masses, or scars and thyroid normal to palpation  RESP: lungs clear to auscultation - no rales, rhonchi or wheezes  CV: regular rate and rhythm, normal S1 S2, no S3 or S4, no murmur, click or rub, no peripheral edema and peripheral pulses strong  MS: no gross musculoskeletal defects noted, no edema  PSYCH: mentation appears normal, affect normal/bright    Diagnostic Test Results:  No results found for this or any previous visit (from the past 24 hour(s)).    ASSESSMENT/PLAN:     Problem List Items Addressed This Visit     None      Visit Diagnoses     Bipolar 1 disorder (H)    -  Primary    Relevant Orders    MENTAL HEALTH REFERRAL  - Adult; Psychiatry and Medication Management; Psychiatry; Other: [facility] [phone]; We will contact you to schedule the appointment or please call with any questions    History of recent air travel        Relevant Medications    zolpidem (AMBIEN) 5 MG tablet       -given #4 ambien for flights to [place] on 3/27 and returning 4/12/19 for travel anxiety; referred to psychiatry for medication management     CHIOMA Villatoro [facility]

## 2019-05-07 NOTE — TELEPHONE ENCOUNTER
RECORDS RECEIVED FROM: Left hamstring injury 2 months ago from roller skating - Per pt    DATE RECEIVED: 5/7   NOTES STATUS DETAILS   OFFICE NOTE from referring provider N/A    OFFICE NOTE from other specialist N/A    DISCHARGE SUMMARY from hospital N/A    DISCHARGE REPORT from the ER N/A    OPERATIVE REPORT N/A    MEDICATION LIST Internal    IMPLANT RECORD/STICKER N/A    LABS     CBC/DIFF N/A    CULTURES N/A    INJECTIONS DONE IN RADIOLOGY N/A    MRI N/A    CT SCAN N/A    XRAYS (IMAGES & REPORTS) N/A    TUMOR     PATHOLOGY  Slides & report N/A

## 2019-05-09 ENCOUNTER — PRE VISIT (OUTPATIENT)
Dept: ORTHOPEDICS | Facility: CLINIC | Age: 29
End: 2019-05-09

## 2019-05-09 ENCOUNTER — OFFICE VISIT (OUTPATIENT)
Dept: ORTHOPEDICS | Facility: CLINIC | Age: 29
End: 2019-05-09
Payer: COMMERCIAL

## 2019-05-09 VITALS — WEIGHT: 140 LBS | BODY MASS INDEX: 21.97 KG/M2 | HEIGHT: 67 IN

## 2019-05-09 DIAGNOSIS — S76.312A STRAIN OF LEFT HAMSTRING, INITIAL ENCOUNTER: Primary | ICD-10-CM

## 2019-05-09 ASSESSMENT — MIFFLIN-ST. JEOR: SCORE: 1387.67

## 2019-05-09 NOTE — LETTER
"  5/9/2019      RE: Yanni Mckenna  [address] [address]        Subjective:   Yanni Mckenna is a 29 year old female who complains of proximal hamstring pain that has been bothering her for almost 3 months. About a year ago, she states that she strained her proximal hamstring by doing yoga. In February, she was doing roller derby when another skater hip checked her and she had immediate onset of pain again in the proximal left hamstring. She was seen by a chiropractor, who states that she had a small tear at the proximal hamstring and possible piriformis involvement.  She has not yet participated in physical therapy.  She endorses overall improvement in her symptoms but not yet complete resolution.  She is unsure if she has some mild weakness.  She was able to ambulate normally after her injuries.  No prior history of chronic hip pain.    Background:   Date of injury: February 2019   Duration of symptoms: 3 months  Mechanism of Injury: Acute; Activity Related   Aggravating factors: Sitting for long periods, certain yoga poses  Relieving Factors: Epsom salt baths, NSAIDs  Prior Evaluation: Chiropractor     PAST MEDICAL, SOCIAL, SURGICAL AND FAMILY HISTORY: She  has a past medical history of Asthma.  She  has a past surgical history that includes no history of surgery.  Her family history is not on file.  She reports that she has never smoked. She has never used smokeless tobacco. She reports that she drinks about 3.0 oz of alcohol per week. She reports that she does not use drugs.    ALLERGIES: She is allergic to amoxicillin.    CURRENT MEDICATIONS: She has a current medication list which includes the following prescription(s): albuterol, levonorgestrel, norethindrone, and zolpidem.     REVIEW OF SYSTEMS: 9 point review of systems is negative except as noted above.     Exam:   Ht 1.702 m (5' 7\")   Wt 63.5 kg (140 lb)   BMI 21.93 kg/m              CONSTITUTIONAL: healthy, alert and no distress  HEAD: " Normocephalic.   SKIN: no suspicious lesions or rashes  GAIT: normal  NEUROLOGIC: Non-focal  PSYCHIATRIC: affect normal/bright and mentation appears normal.    MUSCULOSKELETAL:   -Left hip: Full range of motion.  Mild discomfort with FADIR.  No discomfort with KRISTEN, logroll, or scour. NO weakness with resisted abduction.   - Contralateral hip: FROM. 5/5 strength  -Left hamstring: Point tenderness palpation over the left hamstring origin which reproduces her pain.  No palpable defect.  Full strength.  Resisted hamstring flexion reproduces her pain.       Assessment/Plan:   #) Left hamstring strain    -Findings reviewed with the patient as above  -Discussed options including physical therapy +/-advanced imaging  -At this juncture, she really has not tried any physical therapy and therefore we have elected to proceed with first eccentric strengthening exercises to help with improvement of her discomfort  -If she does not have improvement in her pain despite physical therapy for 6 weeks, the patient will call and an MRI will be ordered and she will then follow-up with me to discuss results  -Recommend avoiding activities that cause pain in the interim, may utilize icing and NSAIDs as needed    Patient endorsed understanding and agreement with the above plan    Trevon Barnett MD  Primary Care Sports Medicine, [facility]    Trevon Barnett MD

## 2019-05-09 NOTE — PROGRESS NOTES
" Subjective:   Yanni Mckenna is a 29 year old female who complains of proximal hamstring pain that has been bothering her for almost 3 months. About a year ago, she states that she strained her proximal hamstring by doing yoga. In February, she was doing roller derby when another skater hip checked her and she had immediate onset of pain again in the proximal left hamstring. She was seen by a chiropractor, who states that she had a small tear at the proximal hamstring and possible piriformis involvement.  She has not yet participated in physical therapy.  She endorses overall improvement in her symptoms but not yet complete resolution.  She is unsure if she has some mild weakness.  She was able to ambulate normally after her injuries.  No prior history of chronic hip pain.    Background:   Date of injury: February 2019   Duration of symptoms: 3 months  Mechanism of Injury: Acute; Activity Related   Aggravating factors: Sitting for long periods, certain yoga poses  Relieving Factors: Epsom salt baths, NSAIDs  Prior Evaluation: Chiropractor     PAST MEDICAL, SOCIAL, SURGICAL AND FAMILY HISTORY: She  has a past medical history of Asthma.  She  has a past surgical history that includes no history of surgery.  Her family history is not on file.  She reports that she has never smoked. She has never used smokeless tobacco. She reports that she drinks about 3.0 oz of alcohol per week. She reports that she does not use drugs.    ALLERGIES: She is allergic to amoxicillin.    CURRENT MEDICATIONS: She has a current medication list which includes the following prescription(s): albuterol, levonorgestrel, norethindrone, and zolpidem.     REVIEW OF SYSTEMS: 9 point review of systems is negative except as noted above.     Exam:   Ht 1.702 m (5' 7\")   Wt 63.5 kg (140 lb)   BMI 21.93 kg/m             CONSTITUTIONAL: healthy, alert and no distress  HEAD: Normocephalic.   SKIN: no suspicious lesions or rashes  GAIT: normal  NEUROLOGIC: " Non-focal  PSYCHIATRIC: affect normal/bright and mentation appears normal.    MUSCULOSKELETAL:   -Left hip: Full range of motion.  Mild discomfort with FADIR.  No discomfort with KRISTEN, logroll, or scour. NO weakness with resisted abduction.   - Contralateral hip: FROM. 5/5 strength  -Left hamstring: Point tenderness palpation over the left hamstring origin which reproduces her pain.  No palpable defect.  Full strength.  Resisted hamstring flexion reproduces her pain.       Assessment/Plan:   #) Left hamstring strain    -Findings reviewed with the patient as above  -Discussed options including physical therapy +/-advanced imaging  -At this juncture, she really has not tried any physical therapy and therefore we have elected to proceed with first eccentric strengthening exercises to help with improvement of her discomfort  -If she does not have improvement in her pain despite physical therapy for 6 weeks, the patient will call and an MRI will be ordered and she will then follow-up with me to discuss results  -Recommend avoiding activities that cause pain in the interim, may utilize icing and NSAIDs as needed    Patient endorsed understanding and agreement with the above plan    Trevon Barnett MD  Primary Care Sports Medicine, [facility]

## 2019-05-14 ENCOUNTER — THERAPY VISIT (OUTPATIENT)
Dept: PHYSICAL THERAPY | Facility: CLINIC | Age: 29
End: 2019-05-14
Payer: COMMERCIAL

## 2019-05-14 DIAGNOSIS — S76.319A HAMSTRING MUSCLE STRAIN: Primary | ICD-10-CM

## 2019-05-14 PROCEDURE — 97161 PT EVAL LOW COMPLEX 20 MIN: CPT | Mod: GP | Performed by: PHYSICAL THERAPIST

## 2019-05-14 PROCEDURE — 97110 THERAPEUTIC EXERCISES: CPT | Mod: GP | Performed by: PHYSICAL THERAPIST

## 2019-05-14 PROCEDURE — 97530 THERAPEUTIC ACTIVITIES: CPT | Mod: GP | Performed by: PHYSICAL THERAPIST

## 2019-05-14 NOTE — PROGRESS NOTES
[facility] Initial Evaluation  Subjective:  Pt presents to PT with c/o L hamstring pain with onset in early Feb after doing roller derby.   She reports she has previously over strain her hamstring.   Pt reports she took 8 weeks off and started working out again 1 month ago which made things more sore.  Pt reports she tried 2 mile run, bootcamp 1-2x week, and squats.       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       PMH: asthma      Yanni Mkcenna is a 29 year old female with a left knee condition.  Condition occurred with:  Repetition/overuse.  Condition occurred: during recreation/sport.  This is a new condition     Site of Pain: L buttock, L hamstring.        Associated symptoms:  Loss of motion/stiffness and loss of strength. Pain is the same all the time.  Exacerbated by: squatting, running, sprinting, jumping, roller derby. and relieved by rest and heat.  Since onset symptoms are gradually improving.  Special testing: none'  Previous treatment: none.  Improvement with previous treatment: n/a.  General health as reported by patient is good.                                              Objective:  System         Lumbar/SI Evaluation  ROM:    AROM Lumbar:   Flexion:            100%, ++  Ext:                    100%   Side Bend:        Left:  100%    Right:  100%  Rotation:           Left:  100%    Right:  100%  Side Glide:        Left:     Right:                   Neural Tension/Mobility:  Lumbar:  Normal                                                  Hip Evaluation    Hip Strength:      Extension:  Left: 3+/5  Pain:Right: 4+/5    Pain:    Abduction:  Left: 4-/5     Pain:Right: 4+/5    Pain:                           Knee Evaluation:  ROM:  AROM: normal  PROM: normal            Strength:     Extension:  Left: 5/5   Pain:      Right: 5/5   Pain:  Flexion:  Left: 4-/5   Pain:      Right: 4+/5   Pain:                  Functional Testing:  : Squat WFL with 4/10 pain.  SLS mild unsteadiness L SLS.  SL  Squat  feels a little more challenging.                         General Evaluation:                              Gait:  Gait wnl general: Pt amb without dev with mild pain with L leg extended.                                         ROS    Assessment/Plan:    Patient is a 29 year old female with right side knee complaints.    Patient has the following significant findings with corresponding treatment plan.                Diagnosis 1:  R hamstring pain  Pain -  hot/cold therapy  Decreased ROM/flexibility - manual therapy and therapeutic exercise  Decreased joint mobility - manual therapy and therapeutic exercise  Decreased strength - therapeutic exercise and therapeutic activities  Impaired gait - gait training  Impaired muscle performance - neuro re-education  Decreased function - therapeutic activities  Impaired posture - neuro re-education    Therapy Evaluation Codes:   1) History comprised of:   Personal factors that impact the plan of care:      None.    Comorbidity factors that impact the plan of care are:      None.     Medications impacting care: None.  2) Examination of Body Systems comprised of:   Body structures and functions that impact the plan of care:      Knee.   Activity limitations that impact the plan of care are:      Running, Sitting, Sports, Squatting/kneeling and Stairs.  3) Clinical presentation characteristics are:   Stable/Uncomplicated.  4) Decision-Making    Low complexity using standardized patient assessment instrument and/or measureable assessment of functional outcome.  Cumulative Therapy Evaluation is: Low complexity.    Previous and current functional limitations:  (See Goal Flow Sheet for this information)    Short term and Long term goals: (See Goal Flow Sheet for this information)     Communication ability:  Patient appears to be able to clearly communicate and understand verbal and written communication and follow directions correctly.  Treatment Explanation - The following has  been discussed with the patient:   RX ordered/plan of care  Anticipated outcomes  Possible risks and side effects  This patient would benefit from PT intervention to resume normal activities.   Rehab potential is good.    Frequency:  1 X week, once daily  Duration:  for 6 weeks  Discharge Plan:  Achieve all LTG.  Independent in home treatment program.  Return to previous functional level by discharge.  Reach maximal therapeutic benefit.    Please refer to the daily flowsheet for treatment today, total treatment time and time spent performing 1:1 timed codes.

## 2019-05-23 ENCOUNTER — THERAPY VISIT (OUTPATIENT)
Dept: PHYSICAL THERAPY | Facility: CLINIC | Age: 29
End: 2019-05-23
Payer: COMMERCIAL

## 2019-05-23 DIAGNOSIS — S76.319A HAMSTRING MUSCLE STRAIN: Primary | ICD-10-CM

## 2019-05-23 PROCEDURE — 97110 THERAPEUTIC EXERCISES: CPT | Mod: GP | Performed by: PHYSICAL THERAPIST

## 2019-07-08 ENCOUNTER — MYC REFILL (OUTPATIENT)
Dept: OBGYN | Facility: CLINIC | Age: 29
End: 2019-07-08

## 2019-07-08 DIAGNOSIS — F32.81 PMDD (PREMENSTRUAL DYSPHORIC DISORDER): ICD-10-CM

## 2019-07-08 DIAGNOSIS — J45.20 MILD INTERMITTENT ASTHMA WITHOUT COMPLICATION: Primary | ICD-10-CM

## 2019-07-08 RX ORDER — ALBUTEROL SULFATE 90 UG/1
1-2 AEROSOL, METERED RESPIRATORY (INHALATION) EVERY 6 HOURS PRN
Qty: 1 INHALER | Refills: 1 | Status: SHIPPED | OUTPATIENT
Start: 2019-07-08 | End: 2019-10-29

## 2019-07-08 NOTE — TELEPHONE ENCOUNTER
Received refill request for Albuterol.  Last in clinic April 2018. Will provide short-term refill. [employer] message sent to patient letting her know of need to schedule with new provider at [facility] for further refills.

## 2019-10-18 ENCOUNTER — TELEPHONE (OUTPATIENT)
Dept: OBGYN | Facility: CLINIC | Age: 29
End: 2019-10-18

## 2019-10-18 DIAGNOSIS — F32.81 PMDD (PREMENSTRUAL DYSPHORIC DISORDER): ICD-10-CM

## 2019-10-18 DIAGNOSIS — F39 EPISODIC MOOD DISORDER (H): ICD-10-CM

## 2019-10-18 RX ORDER — ACETAMINOPHEN AND CODEINE PHOSPHATE 120; 12 MG/5ML; MG/5ML
0.35 SOLUTION ORAL DAILY
Qty: 84 TABLET | Refills: 0 | Status: SHIPPED | OUTPATIENT
Start: 2019-10-18 | End: 2020-11-05

## 2019-10-18 NOTE — TELEPHONE ENCOUNTER
Spoke to Yanni who's last ofv was 7/2018. She will schedule annual with NP today. She believes last PAP was about 2 1/2 yrs ago in [place]. Short term refill of norethindrone sent today to cover until seen,Pt indicated understanding and agreed with plan.

## 2019-10-29 ENCOUNTER — OFFICE VISIT (OUTPATIENT)
Dept: OBGYN | Facility: CLINIC | Age: 29
End: 2019-10-29
Attending: NURSE PRACTITIONER
Payer: COMMERCIAL

## 2019-10-29 VITALS
BODY MASS INDEX: 21.93 KG/M2 | DIASTOLIC BLOOD PRESSURE: 68 MMHG | HEIGHT: 67 IN | SYSTOLIC BLOOD PRESSURE: 118 MMHG | HEART RATE: 74 BPM

## 2019-10-29 DIAGNOSIS — Z00.00 VISIT FOR PREVENTIVE HEALTH EXAMINATION: Primary | ICD-10-CM

## 2019-10-29 DIAGNOSIS — Z23 NEED FOR PROPHYLACTIC VACCINATION AND INOCULATION AGAINST INFLUENZA: ICD-10-CM

## 2019-10-29 DIAGNOSIS — F32.81 PMDD (PREMENSTRUAL DYSPHORIC DISORDER): ICD-10-CM

## 2019-10-29 DIAGNOSIS — Z01.419 ENCOUNTER FOR GYNECOLOGICAL EXAMINATION WITHOUT ABNORMAL FINDING: ICD-10-CM

## 2019-10-29 DIAGNOSIS — J45.20 MILD INTERMITTENT ASTHMA WITHOUT COMPLICATION: ICD-10-CM

## 2019-10-29 DIAGNOSIS — Z12.4 SCREENING FOR MALIGNANT NEOPLASM OF CERVIX: ICD-10-CM

## 2019-10-29 DIAGNOSIS — Z86.59 HX OF BIPOLAR DISORDER: ICD-10-CM

## 2019-10-29 DIAGNOSIS — Z13.220 SCREENING FOR LIPOID DISORDERS: ICD-10-CM

## 2019-10-29 LAB
CHOLEST SERPL-MCNC: 138 MG/DL
HDLC SERPL-MCNC: 63 MG/DL
LDLC SERPL CALC-MCNC: 55 MG/DL
NONHDLC SERPL-MCNC: 75 MG/DL
TRIGL SERPL-MCNC: 98 MG/DL

## 2019-10-29 PROCEDURE — G0145 SCR C/V CYTO,THINLAYER,RESCR: HCPCS | Performed by: NURSE PRACTITIONER

## 2019-10-29 PROCEDURE — 90686 IIV4 VACC NO PRSV 0.5 ML IM: CPT | Mod: ZF

## 2019-10-29 PROCEDURE — 25000128 H RX IP 250 OP 636: Mod: ZF

## 2019-10-29 PROCEDURE — 36415 COLL VENOUS BLD VENIPUNCTURE: CPT | Performed by: NURSE PRACTITIONER

## 2019-10-29 PROCEDURE — 87624 HPV HI-RISK TYP POOLED RSLT: CPT | Performed by: NURSE PRACTITIONER

## 2019-10-29 PROCEDURE — G0008 ADMIN INFLUENZA VIRUS VAC: HCPCS

## 2019-10-29 PROCEDURE — 80061 LIPID PANEL: CPT | Performed by: NURSE PRACTITIONER

## 2019-10-29 RX ORDER — ALBUTEROL SULFATE 90 UG/1
1-2 AEROSOL, METERED RESPIRATORY (INHALATION) EVERY 6 HOURS PRN
Qty: 1 INHALER | Refills: 1 | Status: SHIPPED | OUTPATIENT
Start: 2019-10-29 | End: 2020-02-10

## 2019-10-29 RX ORDER — ACETAMINOPHEN AND CODEINE PHOSPHATE 120; 12 MG/5ML; MG/5ML
0.35 SOLUTION ORAL DAILY
Qty: 84 TABLET | Refills: 3 | Status: SHIPPED | OUTPATIENT
Start: 2019-10-29 | End: 2020-11-05

## 2019-10-29 ASSESSMENT — ANXIETY QUESTIONNAIRES
3. WORRYING TOO MUCH ABOUT DIFFERENT THINGS: NOT AT ALL
7. FEELING AFRAID AS IF SOMETHING AWFUL MIGHT HAPPEN: NOT AT ALL
6. BECOMING EASILY ANNOYED OR IRRITABLE: SEVERAL DAYS
IF YOU CHECKED OFF ANY PROBLEMS ON THIS QUESTIONNAIRE, HOW DIFFICULT HAVE THESE PROBLEMS MADE IT FOR YOU TO DO YOUR WORK, TAKE CARE OF THINGS AT HOME, OR GET ALONG WITH OTHER PEOPLE: NOT DIFFICULT AT ALL
1. FEELING NERVOUS, ANXIOUS, OR ON EDGE: SEVERAL DAYS
5. BEING SO RESTLESS THAT IT IS HARD TO SIT STILL: MORE THAN HALF THE DAYS
GAD7 TOTAL SCORE: 5
2. NOT BEING ABLE TO STOP OR CONTROL WORRYING: NOT AT ALL

## 2019-10-29 ASSESSMENT — PATIENT HEALTH QUESTIONNAIRE - PHQ9
SUM OF ALL RESPONSES TO PHQ QUESTIONS 1-9: 4
5. POOR APPETITE OR OVEREATING: SEVERAL DAYS

## 2019-10-29 NOTE — PROGRESS NOTES
"  Progress Note    SUBJECTIVE:  Yanni Mckenna is an 29 year old, , who requests an Annual Preventive Exam.     Concerns today include:   1. Discussing alternative treatment options for PMDD.  Yanni has been taking a progesterone only pill to suppress ovulation to help manage PMDD symptoms.  She states that this has been somewhat helpful.  However, she feels there is room for improvement.  She prefers to not take a medication everyday to manage PMDD symptoms, if possible.  Pt reports about 1 week prior to when she'd expect a period, she has breast tenderness, then develops the following mood symptoms: irritability, increased tension, agitation, and decreased patience, especially with her partner.  Historically, once a period starts, she notices a \"release\" of her mood symptoms.    H/o bipolar diagnosis as a teenager; however, patient doesn't agree with bipolar diagnosis.  She feels her symptoms are more consistent with depression.  She reports being a \"high energy human\" in general, but denies having \"highs\" such as staying awake for days, going on spending sprees, etc.  She has taken the following medications in the past to treat her mood symptoms: abilify (reports having weight gain), topamax (reports good results), lamictal, depakote, and wellbutrin. She stopped all of these in  and has not been on mental health medications since that time.  Yanni has a hx of migraine with aura, once, when she was on COCs as as teen.     2. Her Mirena IUD has been in place x 5 years (placed 2014).  She asks if it needs to be removed now.  She is using the Mirena IUD for contraception and has been pleased with this method.  Her and her partner are considering starting a family in 1-2 years.      3. Requests refill on Albuterol: Uses this when physically active, about 3 times per week for exercise induced asthma. Denies night time wakening d/t symptoms.     GYN hx:  Denies any h/o STIs or PID  Using Mirena IUD for " contraception and progesterone only pill for PMDD mgmt.    Menstrual History:  Yanni does not have periods with the Mirena and progesterone only pill.  She does notice vaginal spotting if she takes a pill late.  Prior to when she'd expect a period to start, she notices breast tenderness and mood changes as listed above.  Menstrual History 2018   LAST MENSTRUAL PERIOD - 2018   Menarche Age 10 -   Period Cycle (Days) 28 -   Period Duration (Days) 3 -   Method of Contraception Mirena IUD -   Period Pattern Regular -   Menstrual Flow Light -   Menstrual Control Tampon -   Dysmenorrhea Mild -   PMS Symptoms Cramping;Mood Changes -   Reviewed Today Yes -       Last pap: Patient reports a normal pap in .  History of abnormal Pap smear: Patient reports an abnormal pap smear in .    Mammogram current: N/A due to age     Last Colonoscopy: N/A due to age    HISTORY:  levonorgestrel (MIRENA) 20 MCG/24HR IUD, 1 Device by Intrauterine route 2014  norethindrone (MICRONOR) 0.35 MG tablet, Take 1 tablet (0.35 mg) by mouth daily  [] zolpidem (AMBIEN) 5 MG tablet, Take 1 tablet (5 mg) by mouth nightly as needed for sleep    No current facility-administered medications on file prior to visit.     Allergies   Allergen Reactions     Amoxicillin Hives     Immunization History   Administered Date(s) Administered     HepA-Adult 2007     HepB, Unspecified 10/29/1997, 1997, 1998     Hpv, Unspecified  2007, 2007, 2008     Influenza (IIV3) PF 10/09/1994, 1994, 1994     MMR 1990, 1994     Meningococcal,unspecified 2007     Pedvax-hib 1990     Pneumococcal 23 valent 04/15/2016     TD (ADULT, 7+) 1999     Typhoid IM 2018     Typhoid Oral 2018     OB hx: G-0, P-0  OB History    Para Term  AB Living   0 0 0 0 0 0   SAB TAB Ectopic Multiple Live Births   0 0 0 0 0     Past Medical History:   Diagnosis Date      Asthma      Past Surgical History:   Procedure Laterality Date     NO HISTORY OF SURGERY       Family hx: Patient denies any known family h/o breast, ovarian, or colon cancer.  Maternal grandparents both had cancer.  Pt is unsure of what type of cancers.    Social History   Social hx: Pt works full time as a manager at [employer].    She has been in a monogamous relationship with a male partner x4.5 years. She lives with her partner and 2 dogs.  The patient exercises 5-6 days/week through circuit training and yoga.    Socioeconomic History     Marital status: Single     Spouse name: None     Number of children: None     Years of education: None     Highest education level: None   Occupational History     Occupation:    Social Needs     Financial resource strain: None     Food insecurity:     Worry: None     Inability: None     Transportation needs:     Medical: None     Non-medical: None   Tobacco Use     Smoking status: Never Smoker     Smokeless tobacco: Never Used   Substance and Sexual Activity     Alcohol use: Yes     Alcohol/week: 5.0 standard drinks     Types: 5 Standard drinks or equivalent per week     Drug use: No     Sexual activity: Yes     Partners: Male     Birth control/protection: I.U.D.   Lifestyle     Physical activity:     Days per week: None     Minutes per session: None     Stress: None   Relationships     Social connections:     Talks on phone: None     Gets together: None     Attends Protestant service: None     Active member of club or organization: None     Attends meetings of clubs or organizations: None     Relationship status: None     Intimate partner violence:     Fear of current or ex partner: None     Emotionally abused: None     Physically abused: None     Forced sexual activity: None   Other Topics Concern     None   Social History Narrative    How much exercise per week? Active daily,     How much calcium per day? In foods       How  "much caffeine per day? 1 cup coffee    How much vitamin D per day? Multivitamin    Do you/your family wear seatbelts?  Yes    Do you/your family use safety helmets? No    Do you/your family use sunscreen? Yes    Do you/your family keep firearms in the home? No    Do you/your family have a smoke detector(s)? Yes         April 4, 2018 Fco Jimenez LPN       ROS entirely negative except what is noted above.  PHQ-9 SCORE 4/4/2018 2/26/2019 10/29/2019   PHQ-9 Total Score 6 13 4     TIMOTHY-7 SCORE 4/4/2018 10/29/2019   Total Score 1 5       EXAM:  Blood pressure 118/68, pulse 74, height 1.702 m (5' 7\"), not currently breastfeeding. Body mass index is 21.93 kg/m .  General - pleasant female in no acute distress.  Skin - no suspicious lesions or rashes; multiple tattoos noted   EENT- euthyroid with out palpable nodules  Neck - supple without lymphadenopathy.  Lungs - clear to auscultation bilaterally.  Heart - regular rate and rhythm without murmur.  Abdomen - soft, nontender, nondistended, no masses or organomegaly noted.  Musculoskeletal - no gross deformities.  Neurological - normal strength, sensation, and mental status.    Breast Exam:  Breast: Without visible skin changes. No dimpling or lesions seen.   Breasts supple, non-tender with palpation, no dominant mass, nodularity, or nipple discharge noted bilaterally. Axillary nodes negative.      Pelvic Exam:  EG/BUS: Normal genital architecture without lesions, erythema or abnormal secretions, Bartholin's, Urethra, Ligonier's normal  Urethral meatus: normal   Urethra: no masses, tenderness, or scarring   Bladder: no masses or tenderness   Vagina: moist, pink, rugae with small amount of dark red menstrual blood in vaginal vault  Cervix: pink, smooth, no lesions, IUD strings present at cervical os approx 1.5 cm  Uterus: anteverted  Adnexa: Within normal limits, No masses, nodularity, tenderness  Rectum: anus normal    ASSESSMENT:  Encounter Diagnoses   Name Primary?     Visit " for preventive health examination Yes     Need for prophylactic vaccination and inoculation against influenza      Screening for lipoid disorders      Screening for malignant neoplasm of cervix      Encounter for gynecological examination without abnormal finding      Mild intermittent asthma without complication      PMDD (premenstrual dysphoric disorder)      Hx of bipolar disorder         PLAN:   Orders Placed This Encounter   Procedures     Pelvic and Breast Exam Procedure []     Pap Smear Exam [] Do Not Remove     FLU VAC PRESRV FREE QUAD SPLIT VIR 3+YRS IM     Pap imaged thin layer screen with HPV - recommended age 30 - 65 years (select HPV order below)     HPV High Risk Types DNA Cervical     Lipid panel reflex to direct LDL Fasting     MENTAL HEALTH REFERRAL  - Adult; Assessments and Testing; General Psychological Testing; [facility] [[email or web address]]: Health Psychology - [facility] [phone]; Patient call to schedule     Educated patient that Mirena IUD is FDA approved for 5 years, but many studies have shown efficacy for up to 6-7 years.  She opted to continue with Mirena IUD for now.  She may also consider switching to a Paragard IUD in the future given PMDD symptoms.  She is unable to determine if Basic information provided regarding Paragard IUD.    Management options for PMDD were discussed with patient.  Due to patient's previous diagnosis of bipolar disorder, unable to prescribe serotonin specific reuptake inhibitor for management; she was encouraged to have evaluation done through psych to get an accurate diagnosis of her mental health condition(s).  Once that is established, we can further discuss treatment options.  For now, she opted to continue on her progesterone only pill for PMDD management.  She is unable to take COCs due to hx of migraines with aura. Referral placed for psych.    Commended patient on health exercise and diet regimens.  Teaching provided on appropriate calcium intake.      Flu shot  given today.    Lipid panel performed today.  Pap with HPV today; if normal, next pap due in 5 years.  Pt will be notified of results via electronic patient portal.    Return to clinic in one year.  Follow-up as needed.    Will refill rxs for Albuterol and Progesterone only pill.    I, Monika Esteban, completed the PFSH and ROS. I then acted as a scribe for MELODIE Padilla, for the remainder of the visit.  YADIRA Vernon, RN, NP Student    I agree with the PFSH and ROS as completed by the MELODIE Student, except for changes made by me.  The remainder of the encounter was performed by me and scribed by the WHNP Student.  The scribed note accurately reflects my personal services and decisions made by me.  Susana Rojas, DNP, APRN, MELODIE

## 2019-10-29 NOTE — PATIENT INSTRUCTIONS

## 2019-10-29 NOTE — LETTER
"10/29/2019       RE: Yanni Mckenna  [address] [facility] [address]     Dear Colleague,    Thank you for referring your patient, Yanni Mckenna, to the [facility] at [facility]. Please see a copy of my visit note below.      Progress Note    SUBJECTIVE:  Yanni Mckenna is an 29 year old, , who requests an Annual Preventive Exam.     Concerns today include:   1. Discussing alternative treatment options for PMDD.  Yanni has been taking a progesterone only pill to suppress ovulation to help manage PMDD symptoms.  She states that this has been somewhat helpful.  However, she feels there is room for improvement.  She prefers to not take a medication everyday to manage PMDD symptoms, if possible.  Pt reports about 1 week prior to when she'd expect a period, she has breast tenderness, then develops the following mood symptoms: irritability, increased tension, agitation, and decreased patience, especially with her partner.  Historically, once a period starts, she notices a \"release\" of her mood symptoms.    H/o bipolar diagnosis as a teenager; however, patient doesn't agree with bipolar diagnosis.  She feels her symptoms are more consistent with depression.  She reports being a \"high energy human\" in general, but denies having \"highs\" such as staying awake for days, going on spending sprees, etc.  She has taken the following medications in the past to treat her mood symptoms: abilify (reports having weight gain), topamax (reports good results), lamictal, depakote, and wellbutrin. She stopped all of these in  and has not been on mental health medications since that time.  Yanni has a hx of migraine with aura, once, when she was on COCs as as teen.     2. Her Mirena IUD has been in place x 5 years (placed 2014).  She asks if it needs to be removed now.  She is using the Mirena IUD for contraception and has been pleased with this method.  Her and " her partner are considering starting a family in 1-2 years.      3. Requests refill on Albuterol: Uses this when physically active, about 3 times per week for exercise induced asthma. Denies night time wakening d/t symptoms.     GYN hx:  Denies any h/o STIs or PID  Using Mirena IUD for contraception and progesterone only pill for PMDD mgmt.    Menstrual History:  Yanni does not have periods with the Mirena and progesterone only pill.  She does notice vaginal spotting if she takes a pill late.  Prior to when she'd expect a period to start, she notices breast tenderness and mood changes as listed above.  Menstrual History 2018   LAST MENSTRUAL PERIOD - 2018   Menarche Age 10 -   Period Cycle (Days) 28 -   Period Duration (Days) 3 -   Method of Contraception Mirena IUD -   Period Pattern Regular -   Menstrual Flow Light -   Menstrual Control Tampon -   Dysmenorrhea Mild -   PMS Symptoms Cramping;Mood Changes -   Reviewed Today Yes -       Last pap: Patient reports a normal pap in .  History of abnormal Pap smear: Patient reports an abnormal pap smear in .    Mammogram current: N/A due to age     Last Colonoscopy: N/A due to age    HISTORY:  levonorgestrel (MIRENA) 20 MCG/24HR IUD, 1 Device by Intrauterine route 2014  norethindrone (MICRONOR) 0.35 MG tablet, Take 1 tablet (0.35 mg) by mouth daily  [] zolpidem (AMBIEN) 5 MG tablet, Take 1 tablet (5 mg) by mouth nightly as needed for sleep    No current facility-administered medications on file prior to visit.     Allergies   Allergen Reactions     Amoxicillin Hives     Immunization History   Administered Date(s) Administered     HepA-Adult 2007     HepB, Unspecified 10/29/1997, 1997, 1998     Hpv, Unspecified  2007, 2007, 2008     Influenza (IIV3) PF 10/09/1994, 1994, 1994     MMR 1990, 1994     Meningococcal,unspecified 2007     Pedvax-hib 1990     Pneumococcal  23 valent 04/15/2016     TD (ADULT, 7+) 1999     Typhoid IM 2018     Typhoid Oral 2018     OB hx: G-0, P-0  OB History    Para Term  AB Living   0 0 0 0 0 0   SAB TAB Ectopic Multiple Live Births   0 0 0 0 0     Past Medical History:   Diagnosis Date     Asthma      Past Surgical History:   Procedure Laterality Date     NO HISTORY OF SURGERY       Family hx: Patient denies any known family h/o breast, ovarian, or colon cancer.  Maternal grandparents both had cancer.  Pt is unsure of what type of cancers.    Social History   Social hx: Pt works full time as a manager at [employer].    She has been in a monogamous relationship with a male partner x4.5 years. She lives with her partner and 2 dogs.  The patient exercises 5-6 days/week through circuit training and yoga.    Socioeconomic History     Marital status: Single     Spouse name: None     Number of children: None     Years of education: None     Highest education level: None   Occupational History     Occupation:    Social Needs     Financial resource strain: None     Food insecurity:     Worry: None     Inability: None     Transportation needs:     Medical: None     Non-medical: None   Tobacco Use     Smoking status: Never Smoker     Smokeless tobacco: Never Used   Substance and Sexual Activity     Alcohol use: Yes     Alcohol/week: 5.0 standard drinks     Types: 5 Standard drinks or equivalent per week     Drug use: No     Sexual activity: Yes     Partners: Male     Birth control/protection: I.U.D.   Lifestyle     Physical activity:     Days per week: None     Minutes per session: None     Stress: None   Relationships     Social connections:     Talks on phone: None     Gets together: None     Attends Yazidism service: None     Active member of club or organization: None     Attends meetings of clubs or organizations: None     Relationship status: None     Intimate partner violence:     Fear  "of current or ex partner: None     Emotionally abused: None     Physically abused: None     Forced sexual activity: None   Other Topics Concern     None   Social History Narrative    How much exercise per week? Active daily,     How much calcium per day? In foods       How much caffeine per day? 1 cup coffee    How much vitamin D per day? Multivitamin    Do you/your family wear seatbelts?  Yes    Do you/your family use safety helmets? No    Do you/your family use sunscreen? Yes    Do you/your family keep firearms in the home? No    Do you/your family have a smoke detector(s)? Yes         April 4, 2018 Fco Jimenez LPN       ROS entirely negative except what is noted above.  PHQ-9 SCORE 4/4/2018 2/26/2019 10/29/2019   PHQ-9 Total Score 6 13 4     TIMOTHY-7 SCORE 4/4/2018 10/29/2019   Total Score 1 5       EXAM:  Blood pressure 118/68, pulse 74, height 1.702 m (5' 7\"), not currently breastfeeding. Body mass index is 21.93 kg/m .  General - pleasant female in no acute distress.  Skin - no suspicious lesions or rashes; multiple tattoos noted   EENT- euthyroid with out palpable nodules  Neck - supple without lymphadenopathy.  Lungs - clear to auscultation bilaterally.  Heart - regular rate and rhythm without murmur.  Abdomen - soft, nontender, nondistended, no masses or organomegaly noted.  Musculoskeletal - no gross deformities.  Neurological - normal strength, sensation, and mental status.    Breast Exam:  Breast: Without visible skin changes. No dimpling or lesions seen.   Breasts supple, non-tender with palpation, no dominant mass, nodularity, or nipple discharge noted bilaterally. Axillary nodes negative.      Pelvic Exam:  EG/BUS: Normal genital architecture without lesions, erythema or abnormal secretions, Bartholin's, Urethra, [place]'s normal  Urethral meatus: normal   Urethra: no masses, tenderness, or scarring   Bladder: no masses or tenderness   Vagina: moist, pink, rugae with small amount of dark " red menstrual blood in vaginal vault  Cervix: pink, smooth, no lesions, IUD strings present at cervical os approx 1.5 cm  Uterus: anteverted  Adnexa: Within normal limits, No masses, nodularity, tenderness  Rectum: anus normal    ASSESSMENT:  Encounter Diagnoses   Name Primary?     Visit for preventive health examination Yes     Need for prophylactic vaccination and inoculation against influenza      Screening for lipoid disorders      Screening for malignant neoplasm of cervix      Encounter for gynecological examination without abnormal finding      Mild intermittent asthma without complication      PMDD (premenstrual dysphoric disorder)      Hx of bipolar disorder         PLAN:   Orders Placed This Encounter   Procedures     Pelvic and Breast Exam Procedure []     Pap Smear Exam [] Do Not Remove     FLU VAC PRESRV FREE QUAD SPLIT VIR 3+YRS IM     Pap imaged thin layer screen with HPV - recommended age 30 - 65 years (select HPV order below)     HPV High Risk Types DNA Cervical     Lipid panel reflex to direct LDL Fasting     MENTAL HEALTH REFERRAL  - Adult; Assessments and Testing; General Psychological Testing; [facility]MP: Health Psychology - [facility] [phone]; Patient call to schedule     Educated patient that Mirena IUD is FDA approved for 5 years, but many studies have shown efficacy for up to 6-7 years.  She opted to continue with Mirena IUD for now.  She may also consider switching to a Paragard IUD in the future given PMDD symptoms.  She is unable to determine if Basic information provided regarding Paragard IUD.    Management options for PMDD were discussed with patient.  Due to patient's previous diagnosis of bipolar disorder, unable to prescribe serotonin specific reuptake inhibitor for management; she was encouraged to have evaluation done through psych to get an accurate diagnosis of her mental health condition(s).  Once that is established, we can further discuss treatment options.  For  now, she opted to continue on her progesterone only pill for PMDD management.  She is unable to take COCs due to hx of migraines with aura. Referral placed for psych.    Commended patient on health exercise and diet regimens.  Teaching provided on appropriate calcium intake.      Flu shot given today.    Lipid panel performed today.  Pap with HPV today; if normal, next pap due in 5 years.  Pt will be notified of results via electronic patient portal.    Return to clinic in one year.  Follow-up as needed.    Will refill rxs for Albuterol and Progesterone only pill.    I, Monika Esteban, completed the PFSH and ROS. I then acted as a scribe for MELODIE Padilla, for the remainder of the visit.  Monika Esteban, YAON, RN, NP Student    I agree with the PFSH and ROS as completed by the MELODIE Student, except for changes made by me.  The remainder of the encounter was performed by me and scribed by the MELODIE Student.  The scribed note accurately reflects my personal services and decisions made by me.  Susana Rojas, DNP, APRN, MELODIE

## 2019-10-30 ASSESSMENT — ANXIETY QUESTIONNAIRES: GAD7 TOTAL SCORE: 5

## 2019-11-01 LAB
COPATH REPORT: NORMAL
PAP: NORMAL

## 2019-11-05 ENCOUNTER — TELEPHONE (OUTPATIENT)
Dept: OBGYN | Facility: CLINIC | Age: 29
End: 2019-11-05

## 2019-11-05 LAB
FINAL DIAGNOSIS: NORMAL
HPV HR 12 DNA CVX QL NAA+PROBE: NEGATIVE
HPV16 DNA SPEC QL NAA+PROBE: NEGATIVE
HPV18 DNA SPEC QL NAA+PROBE: NEGATIVE
SPECIMEN DESCRIPTION: NORMAL
SPECIMEN SOURCE CVX/VAG CYTO: NORMAL

## 2019-11-05 NOTE — TELEPHONE ENCOUNTER
----- Message from Roslyn Javed RN sent at 10/30/2019 11:03 AM CDT -----  Regarding: FW: Psych referral    ----- Message -----  From: Susana Rojas APRN CNP  Sent: 10/29/2019   3:41 PM CDT  To: Juarez Rn-p [facility]  Subject: Psych referral                                   Dear RNs,       I put in a psych referral for this patient to do a psych assessment to determine if she has bipolar or another type of mental health condition. She has PMDD symptoms and I would like med recommendations.     Thank you for following up on her and making sure she gets scheduled!    Susana Rojas, BELLE, APRN, MELODIE

## 2019-11-05 NOTE — TELEPHONE ENCOUNTER
Sent message to psych intake team requesting they confirm receipt of this referral and when patient has received intake phone call.

## 2020-02-10 DIAGNOSIS — J45.20 MILD INTERMITTENT ASTHMA WITHOUT COMPLICATION: ICD-10-CM

## 2020-02-10 RX ORDER — ALBUTEROL SULFATE 90 UG/1
1-2 AEROSOL, METERED RESPIRATORY (INHALATION) EVERY 6 HOURS PRN
Qty: 1 INHALER | Refills: 1 | Status: SHIPPED | OUTPATIENT
Start: 2020-02-10 | End: 2020-07-10

## 2020-02-10 NOTE — TELEPHONE ENCOUNTER
Received refill request for albuterol.  Last in clinic 10/2019 where this was ordered for use with physical activity. Refill sent.

## 2020-02-20 DIAGNOSIS — J45.20 MILD INTERMITTENT ASTHMA WITHOUT COMPLICATION: Primary | ICD-10-CM

## 2020-02-20 NOTE — TELEPHONE ENCOUNTER
Received notification from pharmacy that albuterol inhaler no longer covered by pt insurance. Will cover xopenex (levalbuterol). Nurse called patient to inquire if she has used this before-- pt does not believe she has.    Will route to veronica to determine if this is an appropriate alternative.

## 2020-02-25 RX ORDER — LEVALBUTEROL TARTRATE 45 UG/1
1-2 AEROSOL, METERED ORAL EVERY 6 HOURS PRN
Qty: 15 G | Refills: 3 | Status: SHIPPED | OUTPATIENT
Start: 2020-02-25 | End: 2020-11-29

## 2020-07-10 ENCOUNTER — MYC REFILL (OUTPATIENT)
Dept: OBGYN | Facility: CLINIC | Age: 30
End: 2020-07-10

## 2020-07-10 DIAGNOSIS — J45.20 MILD INTERMITTENT ASTHMA WITHOUT COMPLICATION: ICD-10-CM

## 2020-07-15 RX ORDER — ALBUTEROL SULFATE 90 UG/1
1-2 AEROSOL, METERED RESPIRATORY (INHALATION) EVERY 6 HOURS PRN
Qty: 1 INHALER | Refills: 3 | Status: SHIPPED | OUTPATIENT
Start: 2020-07-15 | End: 2020-11-05

## 2020-07-15 NOTE — TELEPHONE ENCOUNTER
Refill request for albuteral. Pt last seen in October 2019 where this medication was discussed and to be continued. Sent refill per protocol

## 2020-11-05 ENCOUNTER — OFFICE VISIT (OUTPATIENT)
Dept: OBGYN | Facility: CLINIC | Age: 30
End: 2020-11-05
Attending: NURSE PRACTITIONER
Payer: COMMERCIAL

## 2020-11-05 ENCOUNTER — APPOINTMENT (OUTPATIENT)
Dept: LAB | Facility: CLINIC | Age: 30
End: 2020-11-05
Attending: NURSE PRACTITIONER
Payer: COMMERCIAL

## 2020-11-05 VITALS
DIASTOLIC BLOOD PRESSURE: 82 MMHG | SYSTOLIC BLOOD PRESSURE: 125 MMHG | WEIGHT: 132.8 LBS | HEART RATE: 56 BPM | HEIGHT: 67 IN | BODY MASS INDEX: 20.84 KG/M2

## 2020-11-05 DIAGNOSIS — Z11.3 ROUTINE SCREENING FOR STI (SEXUALLY TRANSMITTED INFECTION): ICD-10-CM

## 2020-11-05 DIAGNOSIS — Z00.00 VISIT FOR PREVENTIVE HEALTH EXAMINATION: Primary | ICD-10-CM

## 2020-11-05 LAB — HIV 1+2 AB+HIV1 P24 AG SERPL QL IA: NONREACTIVE

## 2020-11-05 PROCEDURE — 87491 CHLMYD TRACH DNA AMP PROBE: CPT | Performed by: NURSE PRACTITIONER

## 2020-11-05 PROCEDURE — G0463 HOSPITAL OUTPT CLINIC VISIT: HCPCS

## 2020-11-05 PROCEDURE — 99385 PREV VISIT NEW AGE 18-39: CPT | Performed by: NURSE PRACTITIONER

## 2020-11-05 PROCEDURE — 250N000011 HC RX IP 250 OP 636

## 2020-11-05 PROCEDURE — 87591 N.GONORRHOEAE DNA AMP PROB: CPT | Performed by: NURSE PRACTITIONER

## 2020-11-05 PROCEDURE — 90686 IIV4 VACC NO PRSV 0.5 ML IM: CPT

## 2020-11-05 PROCEDURE — G0008 ADMIN INFLUENZA VIRUS VAC: HCPCS

## 2020-11-05 PROCEDURE — 87389 HIV-1 AG W/HIV-1&-2 AB AG IA: CPT | Performed by: NURSE PRACTITIONER

## 2020-11-05 PROCEDURE — 86780 TREPONEMA PALLIDUM: CPT | Performed by: NURSE PRACTITIONER

## 2020-11-05 SDOH — HEALTH STABILITY: MENTAL HEALTH: HOW OFTEN DO YOU HAVE 6 OR MORE DRINKS ON ONE OCCASION?: NOT ASKED

## 2020-11-05 SDOH — HEALTH STABILITY: MENTAL HEALTH: HOW OFTEN DO YOU HAVE A DRINK CONTAINING ALCOHOL?: MONTHLY OR LESS

## 2020-11-05 SDOH — HEALTH STABILITY: MENTAL HEALTH: HOW MANY STANDARD DRINKS CONTAINING ALCOHOL DO YOU HAVE ON A TYPICAL DAY?: NOT ASKED

## 2020-11-05 ASSESSMENT — ANXIETY QUESTIONNAIRES
6. BECOMING EASILY ANNOYED OR IRRITABLE: NEARLY EVERY DAY
3. WORRYING TOO MUCH ABOUT DIFFERENT THINGS: SEVERAL DAYS
1. FEELING NERVOUS, ANXIOUS, OR ON EDGE: MORE THAN HALF THE DAYS
7. FEELING AFRAID AS IF SOMETHING AWFUL MIGHT HAPPEN: MORE THAN HALF THE DAYS
2. NOT BEING ABLE TO STOP OR CONTROL WORRYING: SEVERAL DAYS
5. BEING SO RESTLESS THAT IT IS HARD TO SIT STILL: SEVERAL DAYS
GAD7 TOTAL SCORE: 12

## 2020-11-05 ASSESSMENT — PATIENT HEALTH QUESTIONNAIRE - PHQ9
5. POOR APPETITE OR OVEREATING: MORE THAN HALF THE DAYS
SUM OF ALL RESPONSES TO PHQ QUESTIONS 1-9: 7

## 2020-11-05 ASSESSMENT — MIFFLIN-ST. JEOR: SCORE: 1350.01

## 2020-11-05 ASSESSMENT — PAIN SCALES - GENERAL: PAINLEVEL: NO PAIN (0)

## 2020-11-05 NOTE — PROGRESS NOTES
Progress Note    SUBJECTIVE:  Yanni Mckenna is an 30 year old, , who requests an Annual Preventive Exam.     Concerns today include: The patient has a new sexual partner and requests STI testing today.     Menstrual History:  - Yanni is currently not having menses on the Mirena IUD. She does report her LMP to be in May 2020.     Last Pap:   - 10/29/19 - NIL + HPV negative    Sexual History:    -Patient is currently sexually active with 1 male partner. She reports no history of STIs and desires screening today.  Currently using Mirena IUD for contraception and is happy with this method. IUD was placed in 2014.    Social History:   - Patient currently lives alone in [facility] and works as a  at [employer] in [facility]      Mental Health:   - History PMDD. Patient reports that she does not like to take medication. She is currently managing her depression symptoms with her diet, yoga and journaling. She does report occasionally self-medicating with marijuana. She also is established with a talk therapist and uses that as needed. She currently feels well managed at this time.      Asthma Control:   - Patient reports feeling her asthma is adequately controlled. She denies SOB, nighttime awakenings and difficulty with exercise. She reports using her MEGHA approximately 4 times per week most often before or after exercise.      HISTORY:    Allergies   Allergen Reactions     Amoxicillin Hives       OB History    Para Term  AB Living   0 0 0 0 0 0   SAB TAB Ectopic Multiple Live Births   0 0 0 0 0     Past Medical History:   Diagnosis Date     Asthma      Past Surgical History:   Procedure Laterality Date     NO HISTORY OF SURGERY       History reviewed. No pertinent family history.  Social History     Socioeconomic History     Marital status: Single     Spouse name: None     Number of children: None     Years of education: None     Highest education level: None   Occupational History      Occupation:    Social Needs     Financial resource strain: None     Food insecurity     Worry: None     Inability: None     Transportation needs     Medical: None     Non-medical: None   Tobacco Use     Smoking status: Never Smoker     Smokeless tobacco: Never Used   Substance and Sexual Activity     Alcohol use: Yes     Alcohol/week: 5.0 standard drinks     Types: 5 Standard drinks or equivalent per week     Frequency: Monthly or less     Comment: 5-10 drinks per week      Drug use: Not Currently     Types: Marijuana     Sexual activity: Yes     Partners: Male     Birth control/protection: I.U.D.   Lifestyle     Physical activity     Days per week: None     Minutes per session: None     Stress: None   Relationships     Social connections     Talks on phone: None     Gets together: None     Attends Denominational service: None     Active member of club or organization: None     Attends meetings of clubs or organizations: None     Relationship status: None     Intimate partner violence     Fear of current or ex partner: None     Emotionally abused: None     Physically abused: None     Forced sexual activity: None   Other Topics Concern     None   Social History Narrative    How much exercise per week? Active daily, yoga 4-5 x's    How much calcium per day? In foods       How much caffeine per day? 1 cup coffee    How much vitamin D per day? Multivitamin    Do you/your family wear seatbelts?  Yes    Do you/your family use safety helmets? No    Do you/your family use sunscreen? Yes    Do you/your family keep firearms in the home? No    Do you/your family have a smoke detector(s)? Yes        November 5, 2020 Fco Jimenez LPN       ROS   ROS: 10 point ROS neg other than the symptoms noted above in the HPI.    PHQ-9 SCORE 2/26/2019 10/29/2019 11/5/2020   PHQ-9 Total Score 13 4 7     TIMOTHY-7 SCORE 4/4/2018 10/29/2019 11/5/2020   Total Score 1 5 12       EXAM:  Blood pressure 125/82, pulse 56, height 1.702 m (5'  "7\"), weight 60.2 kg (132 lb 12.8 oz), not currently breastfeeding. Body mass index is 20.8 kg/m .  General - pleasant female in no acute distress.  Skin - no suspicious lesions or rashes  EENT-   euthyroid with out palpable nodules  Neck - supple without lymphadenopathy.  Lungs - clear to auscultation bilaterally.  Heart - regular rate and rhythm without murmur.  Abdomen - soft, nontender, nondistended, no masses or organomegaly noted.  Musculoskeletal - no gross deformities.  Neurological - normal strength, sensation, and mental status.    Breast Exam:  Breast: Without visible skin changes. No dimpling or lesions seen.   Breasts supple, non-tender with palpation, no dominant mass, nodularity, or nipple discharge noted bilaterally. Axillary nodes negative.      Pelvic Exam:  EG/BUS: Normal genital architecture without lesions, erythema or abnormal secretions.  Bartholin's, Urethra, Dorris's normal   Urethral meatus: normal   Urethra: no masses, tenderness, or scarring  Adnexa: Within normal limits and No masses, nodularity, tenderness  Cervix: no CMT; IUD strings palpable  Uterus: small, mobile, non-tender      ASSESSMENT:  Encounter Diagnoses   Name Primary?     Routine screening for STI (sexually transmitted infection)      Visit for preventive health examination Yes      PLAN:   Orders Placed This Encounter   Procedures     FLU VAC PRESRV FREE QUAD SPLIT VIR 3+YRS IM     HIV Antigen Antibody Combo     Treponema Abs w Reflex to RPR and Titer     - STI Screening done today   - Next PAP due: October 2024  - IUD replacement due: August 2021  - IUD strings palpated on exam today, recommended patient check strings every 1-2 months at home.   - Flu vaccine administered today     Additional teaching done at this visit regarding exercise and mental health.    Return to clinic in one year.  Follow-up as needed.    Anita CRUZ, YAON, RN, WHNP DNP Student, completed the PFSH and ROS. I then acted as a scribe for Susana" MELODIE Rojas, for the remainder of the visit.  Anita Ambrosio, BSN, RN, WHNP DNP Student    I agree with the PFSH and ROS as completed by the MELODIE Student, except for changes made by me.  The remainder of the encounter was performed by me and scribed by the MELODIE Student.  The scribed note accurately reflects my personal services and decisions made by me.  Susana Rojas DNP, APRN, WHNP

## 2020-11-05 NOTE — LETTER
2020       RE: Yanni Mckenna  [address] [facility] [id number]     Dear Colleague,    Thank you for referring your patient, Yanni Mckenna, to the [facility] [place] at [facility]. Please see a copy of my visit note below.    Progress Note    SUBJECTIVE:  Yanni Mckenna is an 30 year old, , who requests an Annual Preventive Exam.     Concerns today include: The patient has a new sexual partner and requests STI testing today.     Menstrual History:  - Yanni is currently not having menses on the Mirena IUD. She does report her LMP to be in May 2020.     Last Pap:   - 10/29/19 - NIL + HPV negative    Sexual History:    -Patient is currently sexually active with 1 male partner. She reports no history of STIs and desires screening today.  Currently using Mirena IUD for contraception and is happy with this method. IUD was placed in 2014.    Social History:   - Patient currently lives alone in [facility] and works as a  at [employer] in [facility]      Mental Health:   - History PMDD. Patient reports that she does not like to take medication. She is currently managing her depression symptoms with her diet, yoga and journaling. She does report occasionally self-medicating with marijuana. She also is established with a talk therapist and uses that as needed. She currently feels well managed at this time.      Asthma Control:   - Patient reports feeling her asthma is adequately controlled. She denies SOB, nighttime awakenings and difficulty with exercise. She reports using her MEGHA approximately 4 times per week most often before or after exercise.      HISTORY:    Allergies   Allergen Reactions     Amoxicillin Hives       OB History    Para Term  AB Living   0 0 0 0 0 0   SAB TAB Ectopic Multiple Live Births   0 0 0 0 0     Past Medical History:   Diagnosis Date     Asthma      Past Surgical History:   Procedure  Laterality Date     NO HISTORY OF SURGERY       History reviewed. No pertinent family history.  Social History     Socioeconomic History     Marital status: Single     Spouse name: None     Number of children: None     Years of education: None     Highest education level: None   Occupational History     Occupation:    Social Needs     Financial resource strain: None     Food insecurity     Worry: None     Inability: None     Transportation needs     Medical: None     Non-medical: None   Tobacco Use     Smoking status: Never Smoker     Smokeless tobacco: Never Used   Substance and Sexual Activity     Alcohol use: Yes     Alcohol/week: 5.0 standard drinks     Types: 5 Standard drinks or equivalent per week     Frequency: Monthly or less     Comment: 5-10 drinks per week      Drug use: Not Currently     Types: Marijuana     Sexual activity: Yes     Partners: Male     Birth control/protection: I.U.D.   Lifestyle     Physical activity     Days per week: None     Minutes per session: None     Stress: None   Relationships     Social connections     Talks on phone: None     Gets together: None     Attends Latter day service: None     Active member of club or organization: None     Attends meetings of clubs or organizations: None     Relationship status: None     Intimate partner violence     Fear of current or ex partner: None     Emotionally abused: None     Physically abused: None     Forced sexual activity: None   Other Topics Concern     None   Social History Narrative    How much exercise per week? Active daily, yoga 4-5 x's    How much calcium per day? In foods       How much caffeine per day? 1 cup coffee    How much vitamin D per day? Multivitamin    Do you/your family wear seatbelts?  Yes    Do you/your family use safety helmets? No    Do you/your family use sunscreen? Yes    Do you/your family keep firearms in the home? No    Do you/your family have a smoke detector(s)? Yes        November 5, 2020  "Fco Jimenez IVY       ROS   ROS: 10 point ROS neg other than the symptoms noted above in the HPI.    PHQ-9 SCORE 2/26/2019 10/29/2019 11/5/2020   PHQ-9 Total Score 13 4 7     TIMOTHY-7 SCORE 4/4/2018 10/29/2019 11/5/2020   Total Score 1 5 12       EXAM:  Blood pressure 125/82, pulse 56, height 1.702 m (5' 7\"), weight 60.2 kg (132 lb 12.8 oz), not currently breastfeeding. Body mass index is 20.8 kg/m .  General - pleasant female in no acute distress.  Skin - no suspicious lesions or rashes  EENT-   euthyroid with out palpable nodules  Neck - supple without lymphadenopathy.  Lungs - clear to auscultation bilaterally.  Heart - regular rate and rhythm without murmur.  Abdomen - soft, nontender, nondistended, no masses or organomegaly noted.  Musculoskeletal - no gross deformities.  Neurological - normal strength, sensation, and mental status.    Breast Exam:  Breast: Without visible skin changes. No dimpling or lesions seen.   Breasts supple, non-tender with palpation, no dominant mass, nodularity, or nipple discharge noted bilaterally. Axillary nodes negative.      Pelvic Exam:  EG/BUS: Normal genital architecture without lesions, erythema or abnormal secretions.  Bartholin's, Urethra, Dozier's normal   Urethral meatus: normal   Urethra: no masses, tenderness, or scarring  Adnexa: Within normal limits and No masses, nodularity, tenderness  Cervix: no CMT; IUD strings palpable  Uterus: small, mobile, non-tender      ASSESSMENT:  Encounter Diagnoses   Name Primary?     Routine screening for STI (sexually transmitted infection)      Visit for preventive health examination Yes      PLAN:   Orders Placed This Encounter   Procedures     FLU VAC PRESRV FREE QUAD SPLIT VIR 3+YRS IM     HIV Antigen Antibody Combo     Treponema Abs w Reflex to RPR and Titer     - STI Screening done today   - Next PAP due: October 2024  - IUD replacement due: August 2021  - IUD strings palpated on exam today, recommended patient check strings every " 1-2 months at home.   - Flu vaccine administered today     Additional teaching done at this visit regarding exercise and mental health.    Return to clinic in one year.  Follow-up as needed.    I, YADIRA Wilson, RN, MELODIE ODONNELL Student, completed the PFSH and ROS. I then acted as a scribe for MELODIE Padilla, for the remainder of the visit.  YADIRA Wilson, RN, MELODIE ODONNELL Student    I agree with the PFSH and ROS as completed by the MELODIE Student, except for changes made by me.  The remainder of the encounter was performed by me and scribed by the MELODIE Student.  The scribed note accurately reflects my personal services and decisions made by me.  Susana Rojas DNP, APRKATHRYN, MELODIE

## 2020-11-06 LAB
C TRACH DNA SPEC QL NAA+PROBE: NEGATIVE
N GONORRHOEA DNA SPEC QL NAA+PROBE: NEGATIVE
SPECIMEN SOURCE: NORMAL
SPECIMEN SOURCE: NORMAL
T PALLIDUM AB SER QL: NONREACTIVE

## 2020-11-06 ASSESSMENT — ANXIETY QUESTIONNAIRES: GAD7 TOTAL SCORE: 12

## 2020-11-06 NOTE — PATIENT INSTRUCTIONS

## 2020-11-29 ENCOUNTER — MYC REFILL (OUTPATIENT)
Dept: OBGYN | Facility: CLINIC | Age: 30
End: 2020-11-29

## 2020-11-29 DIAGNOSIS — J45.20 MILD INTERMITTENT ASTHMA WITHOUT COMPLICATION: ICD-10-CM

## 2020-11-30 RX ORDER — LEVALBUTEROL TARTRATE 45 UG/1
1-2 AEROSOL, METERED ORAL EVERY 6 HOURS PRN
Qty: 15 G | Refills: 3 | Status: SHIPPED | OUTPATIENT
Start: 2020-11-30 | End: 2022-05-03

## 2021-03-22 ENCOUNTER — MYC MEDICAL ADVICE (OUTPATIENT)
Dept: OBGYN | Facility: CLINIC | Age: 31
End: 2021-03-22

## 2021-03-22 DIAGNOSIS — G47.09 OTHER INSOMNIA: Primary | ICD-10-CM

## 2021-03-24 ENCOUNTER — TELEPHONE (OUTPATIENT)
Dept: OBGYN | Facility: CLINIC | Age: 31
End: 2021-03-24

## 2021-03-24 NOTE — TELEPHONE ENCOUNTER
Message received from patient following up on [employer] message sent to Susana Islas on 3/22.    Message was forwarded to Susana Rojas on 3/23, she is not in clinic until 3/26.    [employer] message sent to patient to inform her that the message was forwarded to Susana and that she is not in the clinic until 3/26.

## 2021-03-26 RX ORDER — ZOLPIDEM TARTRATE 5 MG/1
5 TABLET ORAL
Qty: 6 TABLET | Refills: 0 | Status: SHIPPED | OUTPATIENT
Start: 2021-03-26 | End: 2021-10-26

## 2021-10-10 ENCOUNTER — HEALTH MAINTENANCE LETTER (OUTPATIENT)
Age: 31
End: 2021-10-10

## 2021-10-24 NOTE — PROGRESS NOTES
Progress Note    SUBJECTIVE:  Yanni Mckenna is an 31 year old, , who requests an Annual Preventive Exam & IUD replacement.     Concerns today include:     1. Removing IUD and selecting a new option:  Current Mirena IUD was placed in . Concerned that the Mirena IUD hormones have an impact on her mental health and her goals are to find a method that would have less of an impact.     Mental Health:  Over the last few years, she has noticed that her mental health has varied with her menstrual cycle.  She notices huge changes in the week before her period which could include getting into fights with her boyfriend.  Feels like she doesn't have control of her moods.  Describes feeling irritable.  She experiences longer periods of time which last 2-3 months where she feels dark, depressed, and has heaviness and is easily overwhelmed.   In the past she has been on medications but has turned to lifestyle changes and methods such as meditation, exercise, diet, journaling for reflection, and group coaching in the last few years.  Her partner has struggled in the past with depression and mental health which offers her a lot of support and honest conversations.  She also attends therapy regularly with a provider she really likes and will adjust her frequency of visits based on how she is feeling.  As of today, she does not have any suicidal ideations or thoughts of self harm.      LMP:  10/10/21 which was normal. She has her period slightly less than every 28 days and lasts 3 to 4 days with spotting on the first day and last day. She uses ~one tampon a month.  Minimal amount of cramping.  Doesn't remember what her periods or cycles were like before she was on birth control.      Last pap 10/2019- NIL, HPV neg    Contraception Hx:   She has been on various forms of birth control since she was 14 years old.  Hasn't liked taking the pills in the past.      Sexually Active:  One male partner.  IUD is the sole form of  contraception, no condom use. Not concerned about STIs but consents to testing for IUD placement.  No concerns about any form of abuse in relationship.      Diet:  Has made a lot of strides in improving her overall diet which includes prioritizing vegetables and poultry.  Makes balanced choices that don't include much sugar.  She has reduce the amount of eggs she eats.     Exercise: She does Yoga 3-4 times a week and is in an [employer] league that [employer] league which is 90 mins of running.  At least twice a week, she finds other cardio types of activities.    Lipids:WNL in 2019      Menstrual History:  Menstrual History 2/26/2019 10/26/2021 10/26/2021   LAST MENSTRUAL PERIOD 4/22/2018 10/10/2021 -   Menarche Age - - -   Period Cycle (Days) - - 25   Period Duration (Days) - - 3-4   Method of Contraception - - Mirena IUD   Period Pattern - - Regular   Menstrual Flow - - Light   Menstrual Control - - -   Dysmenorrhea - - Severe   PMS Symptoms - - Mood Changes;Cramping   Reviewed Today - - Yes     Mammogram current: not applicable  Last Colonoscopy: N/A    HISTORY:  levalbuterol (XOPENEX HFA) 45 MCG/ACT inhaler, Inhale 1-2 puffs into the lungs every 6 hours as needed for shortness of breath / dyspnea or wheezing  paragard intrauterine copper device, 1 each by Intrauterine route once    No current facility-administered medications on file prior to visit.    Allergies   Allergen Reactions     Amoxicillin Hives     Immunization History   Administered Date(s) Administered     COVID-19,PF,Pfizer (12+ Yrs) 04/23/2021, 05/17/2021     HepA-Adult 04/18/2007     HepB, Unspecified 10/29/1997, 11/24/1997, 04/29/1998     Hpv, Unspecified  04/04/2007, 06/13/2007, 01/09/2008     Influenza (IIV3) PF 10/09/1994, 11/08/1994, 12/05/1994     Influenza Vaccine IM > 6 months Valent IIV4 (Alfuria,Fluzone) 10/29/2019, 11/05/2020, 10/26/2021     MMR 06/21/1990, 04/09/1994     Meningococcal,unspecified 04/18/2007     Pedvax-hib  1990     Pneumococcal 23 valent 04/15/2016     TD (ADULT, 7+) 1999     Typhoid IM 2018     Typhoid Oral 2018       OB History    Para Term  AB Living   0 0 0 0 0 0   SAB TAB Ectopic Multiple Live Births   0 0 0 0 0     Past Medical History:   Diagnosis Date     Asthma      Past Surgical History:   Procedure Laterality Date     NO HISTORY OF SURGERY       History reviewed. No pertinent family history.  Social History     Socioeconomic History     Marital status: Single     Spouse name: None     Number of children: None     Years of education: None     Highest education level: None   Occupational History     Occupation:    Tobacco Use     Smoking status: Never Smoker     Smokeless tobacco: Never Used   Substance and Sexual Activity     Alcohol use: Yes     Alcohol/week: 5.0 standard drinks     Types: 5 Standard drinks or equivalent per week     Comment: 0-5 drinks per week     Drug use: Yes     Types: Marijuana     Sexual activity: Yes     Partners: Male     Birth control/protection: I.U.D.   Other Topics Concern     None   Social History Narrative    How much exercise per week? Active daily, yoga 4-5 x's    How much calcium per day? In foods       How much caffeine per day? 1 cup coffee    How much vitamin D per day? Multivitamin    Do you/your family wear seatbelts?  Yes    Do you/your family use safety helmets? No    Do you/your family use sunscreen? Yes    Do you/your family keep firearms in the home? No    Do you/your family have a smoke detector(s)? 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 6 times per week    How much calcium per day? Through foods       How much caffeine per day? 2 cups coffee    How much vitamin D per day? Not much    Do you/your family wear seatbelts?  Yes    Do you/your family use safety helmets? Yes    Do you/your family use sunscreen? Yes    Do you/your family keep firearms in the home? No    Do  "you/your family have a smoke detector(s)? Yes        Reviewed by Jessica Leon 10-26-21               ROS   ROS: 10 point ROS neg other than the symptoms noted above in the HPI.    PHQ-9 SCORE 2/26/2019 10/29/2019 11/5/2020   PHQ-9 Total Score 13 4 7   .phq  TIMOTHY-7 SCORE 4/4/2018 10/29/2019 11/5/2020   Total Score 1 5 12       EXAM:  Blood pressure 120/61, pulse (!) 47, height 1.702 m (5' 7\"), weight 65.4 kg (144 lb 1.6 oz), last menstrual period 10/10/2021, not currently breastfeeding. Body mass index is 22.57 kg/m .  General - pleasant female in no acute distress.  Skin - no suspicious lesions or rashes  Neck - supple without lymphadenopathy.  Lungs - clear to auscultation bilaterally.  Heart - regular rate and rhythm without murmur.  Abdomen - soft, nontender, nondistended, no masses or organomegaly noted.  Musculoskeletal - no gross deformities.  Neurological - normal strength, sensation, and mental status.    Breast Exam:  Breast: Without visible skin changes. No dimpling or lesions seen.   Breasts supple, non-tender with palpation, no dominant mass, nodularity, or nipple discharge noted bilaterally. Axillary nodes negative.      Pelvic Exam:  EG/BUS: Normal genital architecture without lesions, erythema or abnormal secretions;Bartholin's, Urethra, Lemmon's normal   Urethral meatus: normal   Urethra: no masses, tenderness, or scarring   Vagina: moist, pink, rugae with creamy, white and odorless  secretions  Cervix: no lesions, closed, IUD strings were not visible  Uterus: anteverted, and small, smooth, firm, mobile w/o pain    ASSESSMENT:  Encounter Diagnoses   Name Primary?     Encounter for IUD insertion      Encounter for removal and reinsertion of intrauterine contraceptive device      Screen for STD (sexually transmitted disease)      Visit for preventive health examination Yes        PLAN:   Orders Placed This Encounter   Procedures     INSERTION INTRAUTERINE DEVICE     REMOVE INTRAUTERINE DEVICE     " INFLUENZA VACCINE IM >6 MO VALENT IIV4 (AFLURIA/FLUZONE)     hCG qual urine POCT     Discussed alternative forms of birth control.  Based on her goals regarding her mental health and low to no hormones, a Paraguard IUD was chosen.  If symptoms do not improve, then we could consider the addition of birth control pills to help to manage PMS symptoms.  Educated on side effects and adverse effects and follow-up in 6 weeks.       Additional teaching done at this visit regarding calcium (1200 mg per day), self breast exam, birth control, mental health and weight/diet.    Flu vaccine given today.  STD testing completed today.    Return to clinic in one year.  Follow-up as needed.      SUBJECTIVE:    Is a pregnancy test required: Yes.  Was it positive or negative?  Negative  Was a consent obtained?  Yes     Subjective: Yanni Mckenna is a 31 year old  presents for IUD removal and desires Paraguard type IUD inserted.  She requests removal of the IUD because she wants to change her method of contraception, of problems stated above and the IUD effectiveness has      Patient has been given the opportunity to ask questions about all forms of birth control, including all options appropriate for Yanni Mckenna. Discussed that no method of birth control, except abstinence is 100% effective against pregnancy or sexually transmitted infection.     Yanni Mckenna understands she may have the IUD removed at any time. IUD should be removed by a health care provider and the current IUD will be removed today.    The entire removal and insertion procedure was reviewed with the patient, including care after placement.    PROCEDURE:    Premedicated with ibuprofen.  A speculum exam was performed and the cervix was visualized. The IUD string was not visualized. Using the cytobrush, the strings were retrieved.Then, using the ring forceps, the strings were grasped and the IUD removed intact.    Under sterile technique, cervix was  visualized with a Olivia speculum and prepped with Betadine solution swab x 3. Tenaculum was placed for stability. The uterus was gently straightened and sounded to 7.0 cm. IUD prepared for placement, and IUD inserted according to 's instructions without difficulty or significant ressitance, and deployed at the fundus. The strings were visualized and trimmed to 3.0 cm from the external os. Tenaculum was removed and hemostasis noted. Speculum removed.  Patient tolerated procedure well.    Lot # [id number]  Exp: 4/2027    EBL: minimal    Complications: none      POST PROCEDURE:    Given 's handouts, including when to have IUD removed, list of danger s/sx, side effects and follow up recommended. Encouraged condom use for prevention of STD. Advised to call for any fever, for prolonged or severe pain or bleeding, abnormal vaginal dischage, or unable to palpate strings. She was advised to use pain medications (ibuprofen) as needed for mild to moderate pain. Advised to follow-up in clinic in 4-6 weeks for IUD string check    CHIOMA Esquivel CNP

## 2021-10-26 ENCOUNTER — OFFICE VISIT (OUTPATIENT)
Dept: OBGYN | Facility: CLINIC | Age: 31
End: 2021-10-26
Attending: NURSE PRACTITIONER
Payer: COMMERCIAL

## 2021-10-26 VITALS
HEIGHT: 67 IN | DIASTOLIC BLOOD PRESSURE: 61 MMHG | SYSTOLIC BLOOD PRESSURE: 120 MMHG | BODY MASS INDEX: 22.62 KG/M2 | WEIGHT: 144.1 LBS | HEART RATE: 47 BPM

## 2021-10-26 DIAGNOSIS — Z30.430 ENCOUNTER FOR IUD INSERTION: ICD-10-CM

## 2021-10-26 DIAGNOSIS — Z11.3 SCREEN FOR STD (SEXUALLY TRANSMITTED DISEASE): ICD-10-CM

## 2021-10-26 DIAGNOSIS — Z30.433 ENCOUNTER FOR REMOVAL AND REINSERTION OF INTRAUTERINE CONTRACEPTIVE DEVICE: ICD-10-CM

## 2021-10-26 DIAGNOSIS — Z00.00 VISIT FOR PREVENTIVE HEALTH EXAMINATION: Primary | ICD-10-CM

## 2021-10-26 PROCEDURE — 58301 REMOVE INTRAUTERINE DEVICE: CPT | Performed by: NURSE PRACTITIONER

## 2021-10-26 PROCEDURE — 250N000009 HC RX 250: Performed by: NURSE PRACTITIONER

## 2021-10-26 PROCEDURE — 250N000011 HC RX IP 250 OP 636

## 2021-10-26 PROCEDURE — G0463 HOSPITAL OUTPT CLINIC VISIT: HCPCS

## 2021-10-26 PROCEDURE — 99395 PREV VISIT EST AGE 18-39: CPT | Mod: 25 | Performed by: NURSE PRACTITIONER

## 2021-10-26 PROCEDURE — 90686 IIV4 VACC NO PRSV 0.5 ML IM: CPT

## 2021-10-26 PROCEDURE — G0008 ADMIN INFLUENZA VIRUS VAC: HCPCS

## 2021-10-26 PROCEDURE — 87591 N.GONORRHOEAE DNA AMP PROB: CPT | Performed by: NURSE PRACTITIONER

## 2021-10-26 PROCEDURE — 58300 INSERT INTRAUTERINE DEVICE: CPT | Performed by: NURSE PRACTITIONER

## 2021-10-26 PROCEDURE — 87491 CHLMYD TRACH DNA AMP PROBE: CPT | Performed by: NURSE PRACTITIONER

## 2021-10-26 RX ORDER — COPPER 313.4 MG/1
1 INTRAUTERINE DEVICE INTRAUTERINE ONCE
Status: COMPLETED
Start: 2021-10-26 | End: 2021-10-26

## 2021-10-26 RX ORDER — COPPER 313.4 MG/1
1 INTRAUTERINE DEVICE INTRAUTERINE ONCE
COMMUNITY

## 2021-10-26 RX ADMIN — COPPER 1 EACH: 313.4 INTRAUTERINE DEVICE INTRAUTERINE at 13:14

## 2021-10-26 ASSESSMENT — MIFFLIN-ST. JEOR: SCORE: 1396.26

## 2021-10-26 NOTE — LETTER
10/26/2021       RE: Yanni Mckenna  [address] [facility] [address]     Dear Colleague,    Thank you for referring your patient, Yanni Mckenna, to the [facility] [place] at [facility]. Please see a copy of my visit note below.      Progress Note    SUBJECTIVE:  Yanni Mckenna is an 31 year old, , who requests an Annual Preventive Exam & IUD replacement.     Concerns today include:     1. Removing IUD and selecting a new option:  Current Mirena IUD was placed in . Concerned that the Mirena IUD hormones have an impact on her mental health and her goals are to find a method that would have less of an impact.     Mental Health:  Over the last few years, she has noticed that her mental health has varied with her menstrual cycle.  She notices huge changes in the week before her period which could include getting into fights with her boyfriend.  Feels like she doesn't have control of her moods.  Describes feeling irritable.  She experiences longer periods of time which last 2-3 months where she feels dark, depressed, and has heaviness and is easily overwhelmed.   In the past she has been on medications but has turned to lifestyle changes and methods such as meditation, exercise, diet, journaling for reflection, and group coaching in the last few years.  Her partner has struggled in the past with depression and mental health which offers her a lot of support and honest conversations.  She also attends therapy regularly with a provider she really likes and will adjust her frequency of visits based on how she is feeling.  As of today, she does not have any suicidal ideations or thoughts of self harm.      LMP:  10/10/21 which was normal. She has her period slightly less than every 28 days and lasts 3 to 4 days with spotting on the first day and last day. She uses ~one tampon a month.  Minimal amount of cramping.  Doesn't remember what  her periods or cycles were like before she was on birth control.      Last pap 10/2019- NIL, HPV neg    Contraception Hx:   She has been on various forms of birth control since she was 14 years old.  Hasn't liked taking the pills in the past.      Sexually Active:  One male partner.  IUD is the sole form of contraception, no condom use. Not concerned about STIs but consents to testing for IUD placement.  No concerns about any form of abuse in relationship.      Diet:  Has made a lot of strides in improving her overall diet which includes prioritizing vegetables and poultry.  Makes balanced choices that don't include much sugar.  She has reduce the amount of eggs she eats.     Exercise: She does Yoga 3-4 times a week and is in an Ultimate Fr[employer]bee league that [employer] fr[employer] league which is 90 mins of running.  At least twice a week, she finds other cardio types of activities.    Lipids:WNL in 2019      Menstrual History:  Menstrual History 2/26/2019 10/26/2021 10/26/2021   LAST MENSTRUAL PERIOD 4/22/2018 10/10/2021 -   Menarche Age - - -   Period Cycle (Days) - - 25   Period Duration (Days) - - 3-4   Method of Contraception - - Mirena IUD   Period Pattern - - Regular   Menstrual Flow - - Light   Menstrual Control - - -   Dysmenorrhea - - Severe   PMS Symptoms - - Mood Changes;Cramping   Reviewed Today - - Yes     Mammogram current: not applicable  Last Colonoscopy: N/A    HISTORY:  levalbuterol (XOPENEX HFA) 45 MCG/ACT inhaler, Inhale 1-2 puffs into the lungs every 6 hours as needed for shortness of breath / dyspnea or wheezing  paragard intrauterine copper device, 1 each by Intrauterine route once    No current facility-administered medications on file prior to visit.    Allergies   Allergen Reactions     Amoxicillin Hives     Immunization History   Administered Date(s) Administered     COVID-19,PF,Pfizer (12+ Yrs) 04/23/2021, 05/17/2021     HepA-Adult 04/18/2007     HepB, Unspecified 10/29/1997, 11/24/1997,  1998     Hpv, Unspecified  2007, 2007, 2008     Influenza (IIV3) PF 10/09/1994, 1994, 1994     Influenza Vaccine IM > 6 months Valent IIV4 (Alfuria,Fluzone) 10/29/2019, 2020, 10/26/2021     MMR 1990, 1994     Meningococcal,unspecified 2007     Pedvax-hib 1990     Pneumococcal 23 valent 04/15/2016     TD (ADULT, 7+) 1999     Typhoid IM 2018     Typhoid Oral 2018       OB History    Para Term  AB Living   0 0 0 0 0 0   SAB TAB Ectopic Multiple Live Births   0 0 0 0 0     Past Medical History:   Diagnosis Date     Asthma      Past Surgical History:   Procedure Laterality Date     NO HISTORY OF SURGERY       History reviewed. No pertinent family history.  Social History     Socioeconomic History     Marital status: Single     Spouse name: None     Number of children: None     Years of education: None     Highest education level: None   Occupational History     Occupation:    Tobacco Use     Smoking status: Never Smoker     Smokeless tobacco: Never Used   Substance and Sexual Activity     Alcohol use: Yes     Alcohol/week: 5.0 standard drinks     Types: 5 Standard drinks or equivalent per week     Comment: 0-5 drinks per week     Drug use: Yes     Types: Marijuana     Sexual activity: Yes     Partners: Male     Birth control/protection: I.U.D.   Other Topics Concern     None   Social History Narrative    How much exercise per week? Active daily, yoga 4-5 x's    How much calcium per day? In foods       How much caffeine per day? 1 cup coffee    How much vitamin D per day? Multivitamin    Do you/your family wear seatbelts?  Yes    Do you/your family use safety helmets? No    Do you/your family use sunscreen? Yes    Do you/your family keep firearms in the home? No    Do you/your family have a smoke detector(s)? 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 6 times per week    How  "much calcium per day? Through foods       How much caffeine per day? 2 cups coffee    How much vitamin D per day? Not much    Do you/your family wear seatbelts?  Yes    Do you/your family use safety helmets? Yes    Do you/your family use sunscreen? Yes    Do you/your family keep firearms in the home? No    Do you/your family have a smoke detector(s)? Yes        Reviewed by Jessica Leon 10-26-21               ROS   ROS: 10 point ROS neg other than the symptoms noted above in the HPI.    PHQ-9 SCORE 2/26/2019 10/29/2019 11/5/2020   PHQ-9 Total Score 13 4 7   .phq  TIMOTHY-7 SCORE 4/4/2018 10/29/2019 11/5/2020   Total Score 1 5 12       EXAM:  Blood pressure 120/61, pulse (!) 47, height 1.702 m (5' 7\"), weight 65.4 kg (144 lb 1.6 oz), last menstrual period 10/10/2021, not currently breastfeeding. Body mass index is 22.57 kg/m .  General - pleasant female in no acute distress.  Skin - no suspicious lesions or rashes  Neck - supple without lymphadenopathy.  Lungs - clear to auscultation bilaterally.  Heart - regular rate and rhythm without murmur.  Abdomen - soft, nontender, nondistended, no masses or organomegaly noted.  Musculoskeletal - no gross deformities.  Neurological - normal strength, sensation, and mental status.    Breast Exam:  Breast: Without visible skin changes. No dimpling or lesions seen.   Breasts supple, non-tender with palpation, no dominant mass, nodularity, or nipple discharge noted bilaterally. Axillary nodes negative.      Pelvic Exam:  EG/BUS: Normal genital architecture without lesions, erythema or abnormal secretions;Bartholin's, Urethra, Canadian's normal   Urethral meatus: normal   Urethra: no masses, tenderness, or scarring   Vagina: moist, pink, rugae with creamy, white and odorless  secretions  Cervix: no lesions, closed, IUD strings were not visible  Uterus: anteverted, and small, smooth, firm, mobile w/o pain    ASSESSMENT:  Encounter Diagnoses   Name Primary?     Encounter for IUD insertion "      Encounter for removal and reinsertion of intrauterine contraceptive device      Screen for STD (sexually transmitted disease)      Visit for preventive health examination Yes        PLAN:   Orders Placed This Encounter   Procedures     INSERTION INTRAUTERINE DEVICE     REMOVE INTRAUTERINE DEVICE     INFLUENZA VACCINE IM >6 MO VALENT IIV4 (AFLURIA/FLUZONE)     hCG qual urine POCT     Discussed alternative forms of birth control.  Based on her goals regarding her mental health and low to no hormones, a Paraguard IUD was chosen.  If symptoms do not improve, then we could consider the addition of birth control pills to help to manage PMS symptoms.  Educated on side effects and adverse effects and follow-up in 6 weeks.       Additional teaching done at this visit regarding calcium (1200 mg per day), self breast exam, birth control, mental health and weight/diet.    Flu vaccine given today.  STD testing completed today.    Return to clinic in one year.  Follow-up as needed.      SUBJECTIVE:    Is a pregnancy test required: Yes.  Was it positive or negative?  Negative  Was a consent obtained?  Yes     Subjective: Yanni Mckenna is a 31 year old  presents for IUD removal and desires Paraguard type IUD inserted.  She requests removal of the IUD because she wants to change her method of contraception, of problems stated above and the IUD effectiveness has      Patient has been given the opportunity to ask questions about all forms of birth control, including all options appropriate for Yanni Mckenna. Discussed that no method of birth control, except abstinence is 100% effective against pregnancy or sexually transmitted infection.     Yanni Mckenna understands she may have the IUD removed at any time. IUD should be removed by a health care provider and the current IUD will be removed today.    The entire removal and insertion procedure was reviewed with the patient, including care after  placement.    PROCEDURE:    Premedicated with ibuprofen.  A speculum exam was performed and the cervix was visualized. The IUD string was not visualized. Using the cytobrush, the strings were retrieved.Then, using the ring forceps, the strings were grasped and the IUD removed intact.    Under sterile technique, cervix was visualized with a Olivia speculum and prepped with Betadine solution swab x 3. Tenaculum was placed for stability. The uterus was gently straightened and sounded to 7.0 cm. IUD prepared for placement, and IUD inserted according to 's instructions without difficulty or significant ressitance, and deployed at the fundus. The strings were visualized and trimmed to 3.0 cm from the external os. Tenaculum was removed and hemostasis noted. Speculum removed.  Patient tolerated procedure well.    Lot # [id number]  Exp: 4/2027    EBL: minimal    Complications: none      POST PROCEDURE:    Given 's handouts, including when to have IUD removed, list of danger s/sx, side effects and follow up recommended. Encouraged condom use for prevention of STD. Advised to call for any fever, for prolonged or severe pain or bleeding, abnormal vaginal dischage, or unable to palpate strings. She was advised to use pain medications (ibuprofen) as needed for mild to moderate pain. Advised to follow-up in clinic in 4-6 weeks for IUD string check    HCIOMA Esquivel CNP

## 2021-10-27 LAB
C TRACH DNA SPEC QL NAA+PROBE: NEGATIVE
N GONORRHOEA DNA SPEC QL NAA+PROBE: NEGATIVE

## 2022-01-29 ENCOUNTER — HEALTH MAINTENANCE LETTER (OUTPATIENT)
Age: 32
End: 2022-01-29

## 2022-05-03 ENCOUNTER — MYC MEDICAL ADVICE (OUTPATIENT)
Dept: OBGYN | Facility: CLINIC | Age: 32
End: 2022-05-03
Payer: COMMERCIAL

## 2022-05-03 DIAGNOSIS — J45.20 MILD INTERMITTENT ASTHMA WITHOUT COMPLICATION: ICD-10-CM

## 2022-05-03 RX ORDER — LEVALBUTEROL TARTRATE 45 UG/1
1-2 AEROSOL, METERED ORAL EVERY 6 HOURS PRN
Qty: 15 G | Refills: 3 | Status: SHIPPED | OUTPATIENT
Start: 2022-05-03 | End: 2023-06-19

## 2022-09-01 ENCOUNTER — OFFICE VISIT (OUTPATIENT)
Dept: OBGYN | Facility: CLINIC | Age: 32
End: 2022-09-01
Attending: NURSE PRACTITIONER
Payer: COMMERCIAL

## 2022-09-01 VITALS
DIASTOLIC BLOOD PRESSURE: 72 MMHG | BODY MASS INDEX: 23.67 KG/M2 | WEIGHT: 150.8 LBS | HEIGHT: 67 IN | SYSTOLIC BLOOD PRESSURE: 112 MMHG | HEART RATE: 46 BPM

## 2022-09-01 DIAGNOSIS — B96.89 BACTERIAL VAGINITIS: Primary | ICD-10-CM

## 2022-09-01 DIAGNOSIS — L29.2 VULVAR ITCHING: ICD-10-CM

## 2022-09-01 DIAGNOSIS — N76.0 BACTERIAL VAGINITIS: Primary | ICD-10-CM

## 2022-09-01 DIAGNOSIS — N89.8 VAGINAL ITCHING: ICD-10-CM

## 2022-09-01 DIAGNOSIS — Z11.3 SCREEN FOR STD (SEXUALLY TRANSMITTED DISEASE): ICD-10-CM

## 2022-09-01 LAB
CLUE CELLS: POSITIVE
TRICHOMONAS (WET PREP): NEGATIVE
WBC (WET PREP): POSITIVE
YEAST (WET PREP): NEGATIVE

## 2022-09-01 PROCEDURE — 99214 OFFICE O/P EST MOD 30 MIN: CPT | Performed by: NURSE PRACTITIONER

## 2022-09-01 PROCEDURE — G0463 HOSPITAL OUTPT CLINIC VISIT: HCPCS

## 2022-09-01 PROCEDURE — 87491 CHLMYD TRACH DNA AMP PROBE: CPT | Performed by: NURSE PRACTITIONER

## 2022-09-01 PROCEDURE — 87591 N.GONORRHOEAE DNA AMP PROB: CPT | Performed by: NURSE PRACTITIONER

## 2022-09-01 PROCEDURE — 87210 SMEAR WET MOUNT SALINE/INK: CPT | Performed by: NURSE PRACTITIONER

## 2022-09-01 RX ORDER — FLUCONAZOLE 150 MG/1
150 TABLET ORAL ONCE
Qty: 1 TABLET | Refills: 0 | Status: SHIPPED | OUTPATIENT
Start: 2022-09-01 | End: 2022-09-01

## 2022-09-01 RX ORDER — METRONIDAZOLE 500 MG/1
500 TABLET ORAL 2 TIMES DAILY
Qty: 14 TABLET | Refills: 0 | Status: SHIPPED | OUTPATIENT
Start: 2022-09-01 | End: 2022-09-08

## 2022-09-01 NOTE — LETTER
Date:September 2, 2022      Patient was self referred, no letter generated. Do not send.        [facility] Health Information

## 2022-09-01 NOTE — LETTER
"2022       RE: Yanni Mckenna  [address] [place] [facility] [address]     Dear Colleague,    Thank you for referring your patient, Yanni Mckenna, to the [facility] [place] at [facility]. Please see a copy of my visit note below.    Subjective:  Yanni Mckenna is a 32 yr old female, , who presents to clinic today for evaluation of vaginal itching.    Symptoms started the end of .  Had a little bit of uterine cramping and some vaginal itching. Got her period, and symptoms did not resolve. She called [facility] and was prescribed Diflucan, one dose.  Had a hard time telling if this was helpful for symptoms.     Symptoms have been persistent, not worsened, and happen intermittently. Feels like symptoms are more prevalent in the heat or with activity.  Itching is primarily external; has some thicker discharge.  Continues to have occasional mild cramping, but this has been ongoing since IUD placement 10/2021 (Paragard) and she anticipates her period soon.  She denies bleeding between her periods. Periods are very regular, q28-29 days.   Yanni had used Benzoyl peroxide on buttocks; did not use any in the current areas of itching. Denies use of any other new products.     Sexual hx: sexually active with 1 male partner; open to STD testing.     Past Medical History:   Diagnosis Date     Asthma      Past Surgical History:   Procedure Laterality Date     NO HISTORY OF SURGERY       No family history on file.     Current Outpatient Medications   Medication     fluconazole (DIFLUCAN) 150 MG tablet     levalbuterol (XOPENEX HFA) 45 MCG/ACT inhaler     metroNIDAZOLE (FLAGYL) 500 MG tablet     paragard intrauterine copper device     No current facility-administered medications for this visit.     Objective:  /72   Pulse (!) 46   Ht 1.702 m (5' 7\")   Wt 68.4 kg (150 lb 12.8 oz)   BMI 23.62 kg/m    General: pleasant female in no acute " distress  Psych: normal mentation, well oriented  Respiratory:  Unlabored breathing  Musculoskeletal: no gross deformities  Pelvic Exam:  Vulva: No external lesions, normal hair distribution, no adenopathy  Vagina: Moist, pink, no abnormal discharge, well rugated, no lesions  Cervix: nulliparous, smooth, pink, no visible lesions; IUD strings visualized  Rectal exam: normal anus    Wet prep: pH=5.6; Pos for amine odor; Pos for clue cells; neg for trichomonas and hyphae; Pos for packed WBCs.     Assessment:  Encounter Diagnoses   Name Primary?     Vaginal itching      Screen for STD (sexually transmitted disease)      Bacterial vaginitis Yes     Vulvar itching      Plan:  Treat BV with Metronidazole PO BID x 7 days. Also provided a Rx for Diflucan, may take at the end of antibiotic if symptoms have not completely resolved. If following treatment, pt still has external symptoms, recommend hydrocortisone 1% externally daily x 1-2 weeks.  Gonorrhea &c hlamydia tests also completed today.     Pt expressed understanding and agreement with the plan for care.    Susana Rojas, BELLE, APRN, WHNP        Again, thank you for allowing me to participate in the care of your patient.      Sincerely,    Susana Rojas, CHIOMA CNP

## 2022-09-01 NOTE — PROGRESS NOTES
"Subjective:  Yanni Mckenna is a 32 yr old female, , who presents to clinic today for evaluation of vaginal itching.    Symptoms started the end of .  Had a little bit of uterine cramping and some vaginal itching. Got her period, and symptoms did not resolve. She called [facility] and was prescribed Diflucan, one dose.  Had a hard time telling if this was helpful for symptoms.     Symptoms have been persistent, not worsened, and happen intermittently. Feels like symptoms are more prevalent in the heat or with activity.  Itching is primarily external; has some thicker discharge.  Continues to have occasional mild cramping, but this has been ongoing since IUD placement 10/2021 (Paragard) and she anticipates her period soon.  She denies bleeding between her periods. Periods are very regular, q28-29 days.   Yanni had used Benzoyl peroxide on buttocks; did not use any in the current areas of itching. Denies use of any other new products.     Sexual hx: sexually active with 1 male partner; open to STD testing.     Past Medical History:   Diagnosis Date     Asthma      Past Surgical History:   Procedure Laterality Date     NO HISTORY OF SURGERY       No family history on file.     Current Outpatient Medications   Medication     fluconazole (DIFLUCAN) 150 MG tablet     levalbuterol (XOPENEX HFA) 45 MCG/ACT inhaler     metroNIDAZOLE (FLAGYL) 500 MG tablet     paragard intrauterine copper device     No current facility-administered medications for this visit.     Objective:  /72   Pulse (!) 46   Ht 1.702 m (5' 7\")   Wt 68.4 kg (150 lb 12.8 oz)   BMI 23.62 kg/m    General: pleasant female in no acute distress  Psych: normal mentation, well oriented  Respiratory:  Unlabored breathing  Musculoskeletal: no gross deformities  Pelvic Exam:  Vulva: No external lesions, normal hair distribution, no adenopathy  Vagina: Moist, pink, no abnormal discharge, well rugated, no lesions  Cervix: nulliparous, smooth, pink, no " visible lesions; IUD strings visualized  Rectal exam: normal anus    Wet prep: pH=5.6; Pos for amine odor; Pos for clue cells; neg for trichomonas and hyphae; Pos for packed WBCs.     Assessment:  Encounter Diagnoses   Name Primary?     Vaginal itching      Screen for STD (sexually transmitted disease)      Bacterial vaginitis Yes     Vulvar itching      Plan:  Treat BV with Metronidazole PO BID x 7 days. Also provided a Rx for Diflucan, may take at the end of antibiotic if symptoms have not completely resolved. If following treatment, pt still has external symptoms, recommend hydrocortisone 1% externally daily x 1-2 weeks.  Gonorrhea &c hlamydia tests also completed today.     Pt expressed understanding and agreement with the plan for care.    Susana Rojas, DNP, APRN, WHNP

## 2022-09-02 LAB
C TRACH DNA SPEC QL NAA+PROBE: NEGATIVE
N GONORRHOEA DNA SPEC QL NAA+PROBE: NEGATIVE

## 2022-09-18 ENCOUNTER — HEALTH MAINTENANCE LETTER (OUTPATIENT)
Age: 32
End: 2022-09-18

## 2023-03-22 ENCOUNTER — E-VISIT (OUTPATIENT)
Dept: URGENT CARE | Facility: CLINIC | Age: 33
End: 2023-03-22
Payer: COMMERCIAL

## 2023-03-22 DIAGNOSIS — B96.89 BACTERIAL VAGINOSIS: Primary | ICD-10-CM

## 2023-03-22 DIAGNOSIS — N76.0 BACTERIAL VAGINOSIS: Primary | ICD-10-CM

## 2023-03-22 PROCEDURE — 99421 OL DIG E/M SVC 5-10 MIN: CPT | Performed by: PHYSICIAN ASSISTANT

## 2023-03-22 RX ORDER — METRONIDAZOLE 500 MG/1
500 TABLET ORAL 2 TIMES DAILY
Qty: 14 TABLET | Refills: 0 | Status: SHIPPED | OUTPATIENT
Start: 2023-03-22 | End: 2023-03-29

## 2023-05-06 ENCOUNTER — HEALTH MAINTENANCE LETTER (OUTPATIENT)
Age: 33
End: 2023-05-06

## 2023-06-16 DIAGNOSIS — J45.20 MILD INTERMITTENT ASTHMA WITHOUT COMPLICATION: ICD-10-CM

## 2023-06-16 NOTE — TELEPHONE ENCOUNTER
Pended Levalbuterol prescription refill to CHIOMA Padilla CNP per RN protocol. Patient was last seen in clinic in September of 2022.    [employer] message sent to patient as a reminder to schedule an appointment.

## 2023-06-19 RX ORDER — LEVALBUTEROL TARTRATE 45 UG/1
1-2 AEROSOL, METERED ORAL EVERY 6 HOURS PRN
Qty: 15 G | Refills: 3 | Status: SHIPPED | OUTPATIENT
Start: 2023-06-19 | End: 2024-01-06

## 2023-10-03 NOTE — TELEPHONE ENCOUNTER
DIAGNOSIS: Left sprained ankle,self, no imaging,umr    APPOINTMENT DATE: 10/5/23   NOTES STATUS DETAILS   OFFICE NOTE from referring provider Internal SELF   MEDICATION LIST Internal

## 2023-10-04 DIAGNOSIS — M25.572 LEFT ANKLE PAIN: Primary | ICD-10-CM

## 2023-10-04 NOTE — PROGRESS NOTES
[facility]  Sports Medicine Clinic  [facility]           SUBJECTIVE       Yanni Mckenna is a 33 year old female presenting to clinic today . Pain is improving, but feels weak and stiff. Feels like her foot is unable to fully plantar flex and has apprehension with lateral movement. Does have swelling below bilateral malleoli. Has stayed off of it and hasn't moved the ankle.     Background:   Date of injury: 10/01/23  Duration of symptoms: Day 4-5   Mechanism of Injury: Jumping up to catch a frisbee and landed on her inverted foot  Intensity: 0/10 no activity,    Aggravating factors: Plantar and dorsiflexion stiffness   Relieving Factors: Wrap brace, advil    Prior Evaluation: N/A  XR Ankle Rt 3 Views  Order: [id number]  Impression    COMPARISON:  None.    FINDINGS:  Chest right ankle 3 views. No acute bony abnormalities. Joint spaces are intact, ankle mortise is symmetric. No significant tibiotalar joint effusion.  Exam End: 04/25/23  9:10 AM    Specimen Collected: 04/25/23  9:10 AM Last Resulted: 04/25/23  9:26 AM   Received From: [facility]  Result Received: 10/03/23 10:17 AM       PMH, Medications and Allergies were reviewed and updated as needed.    ROS:  As noted above otherwise negative.    Patient Active Problem List   Diagnosis    Asthma    Disorder of bilirubin excretion    History of abnormal cervical Pap smear    Insomnia    Mild intermittent asthma without complication    Episodic mood disorder (H24)       Current Outpatient Medications   Medication Sig Dispense Refill    levalbuterol (XOPENEX HFA) 45 MCG/ACT inhaler Inhale 1-2 puffs into the lungs every 6 hours as needed for shortness of breath or wheezing 15 g 3    paragard intrauterine copper device 1 each by Intrauterine route once              OBJECTIVE:       Vitals: There were no vitals filed for this visit.  BMI: There is no height or weight on file to calculate BMI.    Gen:  Well nourished and in no acute  distress  HEENT: Extraocular movement intact  Neck: Supple  Pulm:  Breathing Comfortably. No increased respiratory effort.  Psych: Euthymic. Appropriately answers questions    MSK: Ankleandfootwithmild 1+ pitting edema.  Tenderness to palpation of the lateral malleolus, ATFL, and PT FL.  Tenderness along the posterior lateral aspect of the outside of the ankle, consistent with the area of the peroneal tendon.  Range of motion mildly diminished in dorsiflexion.  Plantarflexion, inversion, and eversion are appropriate.  Strength testing of the ankle is appropriate at 5/5, however painful with active eversion.  Anterior drawer without pain, no evidence of instability.  Talar tilt with significant pain.  Negative tib-fib squeeze, and calcaneal squeeze.  Negative dorsiflexion/eversion.      XRAY : Independent evaluation of the left ankle x-rays is without acute osseous abnormalities.          ASSESSMENT and PLAN:     Yanni was seen today for pain.    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  This did happen after an injury.  Based off the examination the patient has multiple ligaments involved with a grade 1-2 sprain including the ATFL, PT FL, and peroneal tendons.  Also some evidence of anterior inferior tibiofibular ligament pain.  The patient has been walking on this, and using a home compression sleeve.  At this point, I have discussed options for moving forward with the patient including obtaining an MRI, to which the patient has declined at this point.  I think this is reasonable.  We will see her back in roughly 2 weeks with a reexamination  to see how she is doing.  In the interim, we have provided her with a cam boot with weightbearing as tolerated along with crutches.  She can use an Ace wrap and elevation to alleviate the remaining swelling in the meantime.  She is not having any mechanical symptoms at this point, if any develop prior to the next appointment, I would want the patient to contact us at which point we can order an MRI of the left ankle.    Options for treatment and/or follow-up care were reviewed with the patient was actively involved in the decision making process. Patient verbalized understanding and was in agreement with the plan.    Berto Dominique DO  , Sports Medicine  Department of Family Medicine and [facility]

## 2023-10-05 ENCOUNTER — PRE VISIT (OUTPATIENT)
Dept: ORTHOPEDICS | Facility: CLINIC | Age: 33
End: 2023-10-05

## 2023-10-05 ENCOUNTER — OFFICE VISIT (OUTPATIENT)
Dept: ORTHOPEDICS | Facility: CLINIC | Age: 33
End: 2023-10-05
Payer: COMMERCIAL

## 2023-10-05 ENCOUNTER — ANCILLARY PROCEDURE (OUTPATIENT)
Dept: GENERAL RADIOLOGY | Facility: CLINIC | Age: 33
End: 2023-10-05
Attending: STUDENT IN AN ORGANIZED HEALTH CARE EDUCATION/TRAINING PROGRAM
Payer: COMMERCIAL

## 2023-10-05 DIAGNOSIS — M76.72 PERONEAL TENDINITIS OF LEFT LOWER EXTREMITY: ICD-10-CM

## 2023-10-05 DIAGNOSIS — M25.572 LEFT ANKLE PAIN: ICD-10-CM

## 2023-10-05 DIAGNOSIS — S93.492A SPRAIN OF POSTERIOR TALOFIBULAR LIGAMENT OF LEFT ANKLE, INITIAL ENCOUNTER: ICD-10-CM

## 2023-10-05 DIAGNOSIS — S93.492A SPRAIN OF ANTERIOR TALOFIBULAR LIGAMENT OF LEFT ANKLE, INITIAL ENCOUNTER: Primary | ICD-10-CM

## 2023-10-05 DIAGNOSIS — S93.432A SPRAIN OF TIBIOFIBULAR LIGAMENT OF LEFT ANKLE, INITIAL ENCOUNTER: ICD-10-CM

## 2023-10-05 PROCEDURE — 99203 OFFICE O/P NEW LOW 30 MIN: CPT | Performed by: STUDENT IN AN ORGANIZED HEALTH CARE EDUCATION/TRAINING PROGRAM

## 2023-10-05 PROCEDURE — 73610 X-RAY EXAM OF ANKLE: CPT | Mod: LT | Performed by: RADIOLOGY

## 2023-10-05 NOTE — PROGRESS NOTES
DME FITTING    Relevant Diagnosis: Ankle injury, left  Short walking boot, Medium was fit on patient's Left ankle.     Person(s) involved in teaching:   Patient    Brace was applied in standard Manner:  Yes  Brace fit well:  Yes  Patient reports brace to fit comfortably:  Yes    Education:   Patient shown self application and removal of brace: Yes  Patient shown how to adjust brace fit, if necessary: Yes  Patient educated on billing and return policy: Yes  Patient confirmed understanding when and how to contact clinic with concerns: Yes      Soto Alston M.A., LAT, ATC  Certified Athletic Trainer

## 2023-10-05 NOTE — LETTER
10/5/2023      RE: Yanni Mckenna  [address]  [facility] [address]     Dear Colleague,    Thank you for referring your patient, Yanni Mckenna, to the [facility] [place]. Please see a copy of my visit note below.    [facility]  Sports Medicine Clinic  Clinics and Surgery Center           SUBJECTIVE       Yanni Mckenna is a 33 year old female presenting to clinic today . Pain is improving, but feels weak and stiff. Feels like her foot is unable to fully plantar flex and has apprehension with lateral movement. Does have swelling below bilateral malleoli. Has stayed off of it and hasn't moved the ankle.     Background:   Date of injury: 10/01/23  Duration of symptoms: Day 4-5   Mechanism of Injury: Jumping up to catch a frisbee and landed on her inverted foot  Intensity: 0/10 no activity,    Aggravating factors: Plantar and dorsiflexion stiffness   Relieving Factors: Wrap brace, advil    Prior Evaluation: N/A  XR Ankle Rt 3 Views  Order: [id number]  Impression    COMPARISON:  None.    FINDINGS:  Chest right ankle 3 views. No acute bony abnormalities. Joint spaces are intact, ankle mortise is symmetric. No significant tibiotalar joint effusion.  Exam End: 04/25/23  9:10 AM    Specimen Collected: 04/25/23  9:10 AM Last Resulted: 04/25/23  9:26 AM   Received From: [facility]  Result Received: 10/03/23 10:17 AM       PMH, Medications and Allergies were reviewed and updated as needed.    ROS:  As noted above otherwise negative.    Patient Active Problem List   Diagnosis    Asthma    Disorder of bilirubin excretion    History of abnormal cervical Pap smear    Insomnia    Mild intermittent asthma without complication    Episodic mood disorder (H24)       Current Outpatient Medications   Medication Sig Dispense Refill    levalbuterol (XOPENEX HFA) 45 MCG/ACT inhaler Inhale 1-2 puffs into the lungs every 6 hours as needed for shortness of breath or wheezing 15 g 3     paragard intrauterine copper device 1 each by Intrauterine route once              OBJECTIVE:       Vitals: There were no vitals filed for this visit.  BMI: There is no height or weight on file to calculate BMI.    Gen:  Well nourished and in no acute distress  HEENT: Extraocular movement intact  Neck: Supple  Pulm:  Breathing Comfortably. No increased respiratory effort.  Psych: Euthymic. Appropriately answers questions    MSK: Ankleandfootwithmild 1+ pitting edema.  Tenderness to palpation of the lateral malleolus, ATFL, and PT FL.  Tenderness along the posterior lateral aspect of the outside of the ankle, consistent with the area of the peroneal tendon.  Range of motion mildly diminished in dorsiflexion.  Plantarflexion, inversion, and eversion are appropriate.  Strength testing of the ankle is appropriate at 5/5, however painful with active eversion.  Anterior drawer without pain, no evidence of instability.  Talar tilt with significant pain.  Negative tib-fib squeeze, and calcaneal squeeze.  Negative dorsiflexion/eversion.      XRAY : Independent evaluation of the left ankle x-rays is without acute osseous abnormalities.          ASSESSMENT and PLAN:     Yanni was seen today for pain.    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  This did happen after an injury.  Based off the examination the patient has multiple ligaments involved with a grade 1-2 sprain including the ATFL, PT FL, and peroneal tendons.  Also some evidence of anterior inferior tibiofibular ligament pain.  The patient has been walking on this, and using a home compression  sleeve.  At this point, I have discussed options for moving forward with the patient including obtaining an MRI, to which the patient has declined at this point.  I think this is reasonable.  We will see her back in roughly 2 weeks with a reexamination to see how she is doing.  In the interim, we have provided her with a cam boot with weightbearing as tolerated along with crutches.  She can use an Ace wrap and elevation to alleviate the remaining swelling in the meantime.  She is not having any mechanical symptoms at this point, if any develop prior to the next appointment, I would want the patient to contact us at which point we can order an MRI of the left ankle.    Options for treatment and/or follow-up care were reviewed with the patient was actively involved in the decision making process. Patient verbalized understanding and was in agreement with the plan.    Berto Dominique DO  , Sports Medicine  Department of Family Medicine and [facility]

## 2023-10-18 NOTE — PROGRESS NOTES
[facility]  Sports Medicine Clinic  Clinics and Surgery Center           SUBJECTIVE       Yanni Mckenna is a 33 year old female presenting to clinic today for follow-up of the left ankle.  Patient overall has been doing well, ditched the boot roughly 1 week into immobilization because she was feeling well.  No instability.  Does have some soreness after long walks.  Is wondering about bracing and physical therapy..    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  This did happen after an injury.  Based off the examination the patient has multiple ligaments involved with a grade 1-2 sprain including the ATFL, PT FL, and peroneal tendons.  Also some evidence of anterior inferior tibiofibular ligament pain.  The patient has been walking on this, and using a home compression sleeve.  At this point, I have discussed options for moving forward with the patient including obtaining an MRI, to which the patient has declined at this point.  I think this is reasonable.  We will see her back in roughly 2 weeks with a reexamination to see how she is doing.  In the interim, we have provided her with a cam boot with weightbearing as tolerated along with crutches.  She can use an Ace wrap and elevation to alleviate the remaining swelling in the meantime.  She is not having any mechanical symptoms at this point, if any develop prior to the next appointment, I would want the patient to contact us at which point we can order an MRI of the left ankle.       Study Result    Narrative & Impression   3 views left ankle radiographs 10/5/2023 11:08 AM     History: Left ankle pain      Comparison: none     Findings:     Standing AP, oblique, and lateral  views of the left ankle were  obtained.      No acute osseous abnormality.       Ankle mortise and syndesmosis are congruent. No talar dome lesion.     Soft tissue is unremarkable.                                                                      Impression:  1. No acute osseous abnormality.  2. No substantial degenerative change       PMH, Medications and Allergies were reviewed and updated as needed.    ROS:  As noted above otherwise negative.    Patient Active Problem List   Diagnosis    Asthma    Disorder of bilirubin excretion    History of abnormal cervical Pap smear    Insomnia    Mild intermittent asthma without complication    Episodic mood disorder (H24)       Current Outpatient Medications   Medication Sig Dispense Refill    levalbuterol (XOPENEX HFA) 45 MCG/ACT inhaler Inhale 1-2 puffs into the lungs every 6 hours as needed for shortness of breath or wheezing 15 g 3    paragard intrauterine copper device 1 each by Intrauterine route once              OBJECTIVE:       Vitals: There were no vitals filed for this visit.  BMI: There is no height or weight on file to calculate BMI.    Gen:  Well nourished and in no acute distress  HEENT: Extraocular movement intact  Neck: Supple  Pulm:  Breathing Comfortably. No increased respiratory effort.  Psych: Euthymic. Appropriately answers questions    MSK:   Left ankle with no evidence of effusion.  Tenderness to palpation in the ATFL, PT FL, and peroneal tendons.  No deltoid ligament or medial malleoli or pain.  No lateral malleoli or pain.  Full range of motion of the ankle.  Positive talar tilt.  Negative anterior drawer, diminished pain with dorsiflexion/eversion to 2/10.  Negative tib-fib squeeze.  Negative calcaneal squeeze.            ASSESSMENT and PLAN:     Yanni was seen today for recheck.    Diagnoses and all orders for this visit:    Sprain of anterior talofibular ligament of left  ankle, initial encounter  -     Physical Therapy Referral; Future    Sprain of posterior talofibular ligament of left ankle, initial encounter  -     Physical Therapy Referral; Future    Sprain of tibiofibular ligament of left ankle, initial encounter  -     Physical Therapy Referral; Future    Peroneal tendinitis of left lower extremity  -     Physical Therapy Referral; Future      33-year-old female return to clinic today for the above diagnosis, roughly 2 weeks into injury and recommendations for immobilization.  The patient did not wear the cam boot very long, which I do think would have expedited her recovery.  Given the fact that she has some peroneal tendinosis/tendinitis, this sometimes can take up to 4 to 6 weeks before full resolution has presented.  However I do not think the patient will continue to wear the cam boot regularly, given the level of activity, we have provided her with a lace up ankle brace today for some form of stability while she is healing.  I have also placed the patient in physical therapy as well.  The patient is having any return of pain to the region of the peroneal tendons, I would want her to return to the cam boot.  She does have a trip coming up when she is leaving for [place] for 2 weeks, she says she is not doing any hiking, but I have recommended she bring the boot and the brace with her.  I will see her back in 6 to 8 weeks, after starting physical therapy to assess how she is doing.  If any regression, would consider advanced imaging.  ER and urgent care precautions advised.  Return precautions advised.    Options for treatment and/or follow-up care were reviewed with the patient was actively involved in the decision making process. Patient verbalized understanding and was in agreement with the plan.    Berto Dominique DO  , Sports Medicine  Department of Family Medicine and [facility]

## 2023-10-20 ENCOUNTER — OFFICE VISIT (OUTPATIENT)
Dept: ORTHOPEDICS | Facility: CLINIC | Age: 33
End: 2023-10-20
Payer: COMMERCIAL

## 2023-10-20 DIAGNOSIS — S93.432A SPRAIN OF TIBIOFIBULAR LIGAMENT OF LEFT ANKLE, INITIAL ENCOUNTER: ICD-10-CM

## 2023-10-20 DIAGNOSIS — S93.492A SPRAIN OF ANTERIOR TALOFIBULAR LIGAMENT OF LEFT ANKLE, INITIAL ENCOUNTER: Primary | ICD-10-CM

## 2023-10-20 DIAGNOSIS — M76.72 PERONEAL TENDINITIS OF LEFT LOWER EXTREMITY: ICD-10-CM

## 2023-10-20 DIAGNOSIS — S93.492A SPRAIN OF POSTERIOR TALOFIBULAR LIGAMENT OF LEFT ANKLE, INITIAL ENCOUNTER: ICD-10-CM

## 2023-10-20 PROCEDURE — 99214 OFFICE O/P EST MOD 30 MIN: CPT | Performed by: STUDENT IN AN ORGANIZED HEALTH CARE EDUCATION/TRAINING PROGRAM

## 2023-10-20 NOTE — LETTER
10/20/2023      RE: Yanni Mckenna  [address] [facility] [address]     Dear Colleague,    Thank you for referring your patient, Yanni Mckenna, to the [facility] [place]. Please see a copy of my visit note below.    [facility]  Sports Medicine Clinic  Clinics and Surgery Center           SUBJECTIVE       Yanni Mckenna is a 33 year old female presenting to clinic today for follow-up of the left ankle.  Patient overall has been doing well, ditched the boot roughly 1 week into immobilization because she was feeling well.  No instability.  Does have some soreness after long walks.  Is wondering about bracing and physical therapy..    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  This did happen after an injury.  Based off the examination the patient has multiple ligaments involved with a grade 1-2 sprain including the ATFL, PT FL, and peroneal tendons.  Also some evidence of anterior inferior tibiofibular ligament pain.  The patient has been walking on this, and using a home compression sleeve.  At this point, I have discussed options for moving forward with the patient including obtaining an MRI, to which the patient has declined at this point.  I think this is reasonable.  We will see her back in roughly 2 weeks with a reexamination to see how she is doing.  In the interim, we have provided her with a cam boot with weightbearing as tolerated along with crutches.  She can use an Ace wrap and elevation to alleviate the remaining swelling in the meantime.  She is not having any mechanical symptoms at this point, if any  develop prior to the next appointment, I would want the patient to contact us at which point we can order an MRI of the left ankle.       Study Result    Narrative & Impression   3 views left ankle radiographs 10/5/2023 11:08 AM     History: Left ankle pain     Comparison: none     Findings:     Standing AP, oblique, and lateral  views of the left ankle were  obtained.      No acute osseous abnormality.       Ankle mortise and syndesmosis are congruent. No talar dome lesion.     Soft tissue is unremarkable.                                                                      Impression:  1. No acute osseous abnormality.  2. No substantial degenerative change       PMH, Medications and Allergies were reviewed and updated as needed.    ROS:  As noted above otherwise negative.    Patient Active Problem List   Diagnosis     Asthma     Disorder of bilirubin excretion     History of abnormal cervical Pap smear     Insomnia     Mild intermittent asthma without complication     Episodic mood disorder (H24)       Current Outpatient Medications   Medication Sig Dispense Refill     levalbuterol (XOPENEX HFA) 45 MCG/ACT inhaler Inhale 1-2 puffs into the lungs every 6 hours as needed for shortness of breath or wheezing 15 g 3     paragard intrauterine copper device 1 each by Intrauterine route once              OBJECTIVE:       Vitals: There were no vitals filed for this visit.  BMI: There is no height or weight on file to calculate BMI.    Gen:  Well nourished and in no acute distress  HEENT: Extraocular movement intact  Neck: Supple  Pulm:  Breathing Comfortably. No increased respiratory effort.  Psych: Euthymic. Appropriately answers questions    MSK:   Left ankle with no evidence of effusion.  Tenderness to palpation in the ATFL, PT FL, and peroneal tendons.  No deltoid ligament or medial malleoli or pain.  No lateral malleoli or pain.  Full range of motion of the ankle.  Positive talar tilt.  Negative anterior drawer,  diminished pain with dorsiflexion/eversion to 2/10.  Negative tib-fib squeeze.  Negative calcaneal squeeze.            ASSESSMENT and PLAN:     Yanni was seen today for recheck.    Diagnoses and all orders for this visit:    Sprain of anterior talofibular ligament of left ankle, initial encounter  -     Physical Therapy Referral; Future    Sprain of posterior talofibular ligament of left ankle, initial encounter  -     Physical Therapy Referral; Future    Sprain of tibiofibular ligament of left ankle, initial encounter  -     Physical Therapy Referral; Future    Peroneal tendinitis of left lower extremity  -     Physical Therapy Referral; Future      33-year-old female return to clinic today for the above diagnosis, roughly 2 weeks into injury and recommendations for immobilization.  The patient did not wear the cam boot very long, which I do think would have expedited her recovery.  Given the fact that she has some peroneal tendinosis/tendinitis, this sometimes can take up to 4 to 6 weeks before full resolution has presented.  However I do not think the patient will continue to wear the cam boot regularly, given the level of activity, we have provided her with a lace up ankle brace today for some form of stability while she is healing.  I have also placed the patient in physical therapy as well.  The patient is having any return of pain to the region of the peroneal tendons, I would want her to return to the cam boot.  She does have a trip coming up when she is leaving for [place] for 2 weeks, she says she is not doing any hiking, but I have recommended she bring the boot and the brace with her.  I will see her back in 6 to 8 weeks, after starting physical therapy to assess how she is doing.  If any regression, would consider advanced imaging.  ER and urgent care precautions advised.  Return precautions advised.    Options for treatment and/or follow-up care were reviewed with the patient was actively involved  in the decision making process. Patient verbalized understanding and was in agreement with the plan.    Berto Dominique DO  , Sports Medicine  Department of Family Medicine and [facility]      Again, thank you for allowing me to participate in the care of your patient.      Sincerely,    Berto Dominique DO

## 2023-11-08 ENCOUNTER — THERAPY VISIT (OUTPATIENT)
Dept: PHYSICAL THERAPY | Facility: CLINIC | Age: 33
End: 2023-11-08
Attending: STUDENT IN AN ORGANIZED HEALTH CARE EDUCATION/TRAINING PROGRAM
Payer: COMMERCIAL

## 2023-11-08 DIAGNOSIS — M76.72 PERONEAL TENDINITIS OF LEFT LOWER EXTREMITY: ICD-10-CM

## 2023-11-08 DIAGNOSIS — S93.492A SPRAIN OF ANTERIOR TALOFIBULAR LIGAMENT OF LEFT ANKLE, INITIAL ENCOUNTER: ICD-10-CM

## 2023-11-08 DIAGNOSIS — S93.432A SPRAIN OF TIBIOFIBULAR LIGAMENT OF LEFT ANKLE, INITIAL ENCOUNTER: ICD-10-CM

## 2023-11-08 DIAGNOSIS — S93.492A SPRAIN OF POSTERIOR TALOFIBULAR LIGAMENT OF LEFT ANKLE, INITIAL ENCOUNTER: ICD-10-CM

## 2023-11-08 PROCEDURE — 97110 THERAPEUTIC EXERCISES: CPT | Mod: GP

## 2023-11-08 PROCEDURE — 97161 PT EVAL LOW COMPLEX 20 MIN: CPT | Mod: GP

## 2023-11-08 ASSESSMENT — ACTIVITIES OF DAILY LIVING (ADL)
HIGHEST_POTENTIAL_ADL_SCORE:: 84
ACTIVITIES_OF_DAILY_LIVING_MEASURE_SCORE(%):: 92.86
TOTAL_ADL_ITEM_SCORE:: 78

## 2023-11-17 ENCOUNTER — THERAPY VISIT (OUTPATIENT)
Dept: PHYSICAL THERAPY | Facility: CLINIC | Age: 33
End: 2023-11-17
Payer: COMMERCIAL

## 2023-11-17 DIAGNOSIS — S93.492A SPRAIN OF POSTERIOR TALOFIBULAR LIGAMENT OF LEFT ANKLE, INITIAL ENCOUNTER: Primary | ICD-10-CM

## 2023-11-17 DIAGNOSIS — S93.432A SPRAIN OF TIBIOFIBULAR LIGAMENT OF LEFT ANKLE, INITIAL ENCOUNTER: ICD-10-CM

## 2023-11-17 DIAGNOSIS — M76.72 PERONEAL TENDINITIS OF LEFT LOWER EXTREMITY: ICD-10-CM

## 2023-11-17 PROCEDURE — 97112 NEUROMUSCULAR REEDUCATION: CPT | Mod: GP

## 2023-11-17 PROCEDURE — 97110 THERAPEUTIC EXERCISES: CPT | Mod: GP

## 2023-11-28 ENCOUNTER — THERAPY VISIT (OUTPATIENT)
Dept: PHYSICAL THERAPY | Facility: CLINIC | Age: 33
End: 2023-11-28
Payer: COMMERCIAL

## 2023-11-28 DIAGNOSIS — S93.492A SPRAIN OF POSTERIOR TALOFIBULAR LIGAMENT OF LEFT ANKLE, INITIAL ENCOUNTER: Primary | ICD-10-CM

## 2023-11-28 DIAGNOSIS — M76.72 PERONEAL TENDINITIS OF LEFT LOWER EXTREMITY: ICD-10-CM

## 2023-11-28 DIAGNOSIS — S93.432A SPRAIN OF TIBIOFIBULAR LIGAMENT OF LEFT ANKLE, INITIAL ENCOUNTER: ICD-10-CM

## 2023-11-28 PROCEDURE — 97112 NEUROMUSCULAR REEDUCATION: CPT | Mod: GP

## 2023-11-28 PROCEDURE — 97110 THERAPEUTIC EXERCISES: CPT | Mod: GP

## 2023-11-28 NOTE — PROGRESS NOTES
11/28/23 0500   Appointment Info   Signing clinician's name / credentials Alejandro Schultz, PT, DPT, CSCS   Total/Authorized Visits [employer]   Visits Used 3   Medical Diagnosis Sprain of anterior talofibular ligament of left ankle, Sprain of posterior talofibular ligament of left ankle, Sprain of tibiofibular ligament of left ankle, Peroneal tendinitis of left lower extremity   PT Tx Diagnosis Left ankle sprain/pain   Progress Note/Certification   Onset of illness/injury or Date of Surgery 10/04/23   Therapy Frequency 1x per week   Predicted Duration 6 weeks   Progress Note Completed Date 11/08/23   PT Goal 1   Goal Identifier LTG 1   Goal Description Pt to be able to participate in recreational activities such as running and ultimate frisbee with 0/10 ankle pain   Rationale to maximize safety and independence with performance of ADLs and functional tasks   Goal Progress Yoga   Target Date 12/22/23   Subjective Report   Subjective Report Feeling a lot better, she is pretty exicted about it. Can blaance a lot better in yoga. Walking around is going well, not many issues.   Objective Measures   Objective Measures Objective Measure 1;Objective Measure 2   Objective Measure 1   Objective Measure Left ankle AROM   Details PF: WNL, DF: Lacking 5 deg compared to contralateral side, INV: 30 deg, EV: 16 deg (right eversion 25 deg)   Objective Measure 2   Objective Measure Left Ankle MMTs   Details PF: 5/5 in modified testing, DF: 5/5, Inv: 4+/5 (slight discomfort), Ev 4+/5 (slight discomfort)   Treatment Interventions (PT)   Interventions Therapeutic Procedure/Exercise;Neuromuscular Re-education   Therapeutic Procedure/Exercise   Therapeutic Procedures: strength, endurance, ROM, flexibillity minutes (49134) 30   Ther Proc 1 BAPS board level 3   Ther Proc 1 - Details x20 FW/BW and Inv/EV, 10 circles CW/CCW   PTRx Ther Proc 1 Toe Raises   PTRx Ther Proc 1 - Details x20 with weight shift onto left LE for eccentric portion of exercise  "  PTRx Ther Proc 2 Reverse Toe Raises   PTRx Ther Proc 2 - Details x20 - cued for slow descent of toes   PTRx Ther Proc 3 Ankle Alphabet   PTRx Ther Proc 3 - Details VR - encouraged to continue performing at home   PTRx Ther Proc 4 Long Sit Gastroc Stretch With Towel   PTRx Ther Proc 4 - Details 2x30\" holds left side only   PTRx Ther Proc 5 Theraband ankle eversion   PTRx Ther Proc 5 - Details 2x15 red TB   PTRx Ther Proc 6 Theraband Ankle Inversion red TB left only   PTRx Ther Proc 6 - Details 2x15 red TB left only   Skilled Intervention Provided exercise modification/progression based on patient response   Patient Response/Progress pt tolerated well with reports of fatigue but no increase in left ankle pain at end of session   Neuromuscular Re-education   Neuromuscular re-ed of mvmt, balance, coord, kinesthetic sense, posture, proprioception minutes (41038) 10   Neuro Re-ed 1 Single leg balance   Neuro Re-ed 1 - Details 2x30\" holds on airex pad left only - reports feeling fatigue in area of ATFL with exercise but no increase in pain   Skilled Intervention demonstrated proper exercise technique with cues; progressed exercise based on patient response   Patient Response/Progress pt tolerated well with reports of fatigue but no increase in left ankle pain at end of session   Neuro Re-ed 3 bosu ball foward lunges   Neuro Re-ed 3 - Details 2x15 forward lunges on left side   Education   Learner/Method Patient;No Barriers to Learning   Plan   Home program PTRx (printoff provided)   Plan for next session BOSU ball exercise, continue progressing ankle strength/mobility   Total Session Time   Timed Code Treatment Minutes 40   Total Treatment Time (sum of timed and untimed services) 40         PLAN  Continue therapy per current plan of care. Pt is making good progress. Able to tolerated dynamic strength and balance/proprioception training well. Will progress over pt's remaining visits.    Beginning/End Dates of Progress Note " Reporting Period:  11/08/23 to 11/28/2023    Referring Provider:  Berto Dominique

## 2023-11-29 NOTE — PROGRESS NOTES
[facility]  Sports Medicine Clinic  Clinics and Surgery Center           SUBJECTIVE       Yanni Mckenna is a 33 year old female presenting to clinic today for follow-up of the left ankle and is overall doing very well.  Continued in physical therapy with no pain.  Beginning to gradually increase her exercises, and hopeful to get back on the treadmill very soon.  No swelling or instability.    10/20/23: Sprain of anterior talofibular ligament of left ankle, initial encounter  -     Physical Therapy Referral; Future     Sprain of posterior talofibular ligament of left ankle, initial encounter  -     Physical Therapy Referral; Future     Sprain of tibiofibular ligament of left ankle, initial encounter  -     Physical Therapy Referral; Future     Peroneal tendinitis of left lower extremity  -     Physical Therapy Referral; Future        33-year-old female return to clinic today for the above diagnosis, roughly 2 weeks into injury and recommendations for immobilization.  The patient did not wear the cam boot very long, which I do think would have expedited her recovery.  Given the fact that she has some peroneal tendinosis/tendinitis, this sometimes can take up to 4 to 6 weeks before full resolution has presented.  However I do not think the patient will continue to wear the cam boot regularly, given the level of activity, we have provided her with a lace up ankle brace today for some form of stability while she is healing.  I have also placed the patient in physical therapy as well.  The patient is having any return of pain to the region of the peroneal tendons, I would want her to return to the cam boot.  She does have a trip coming up when she is leaving for [place] for 2 weeks, she says she is not doing any hiking, but I have recommended she bring the boot and the brace with her.  I will see her back in 6 to 8 weeks, after starting physical therapy to assess how she is doing.  If any regression,  would consider advanced imaging.  ER and urgent care precautions advised.  Return precautions advised.    Study Result    Narrative & Impression   3 views left ankle radiographs 10/5/2023 11:08 AM     History: Left ankle pain     Comparison: none     Findings:     Standing AP, oblique, and lateral  views of the left ankle were  obtained.      No acute osseous abnormality.       Ankle mortise and syndesmosis are congruent. No talar dome lesion.     Soft tissue is unremarkable.                                                                      Impression:  1. No acute osseous abnormality.  2. No substantial degenerative change.        PMH, Medications and Allergies were reviewed and updated as needed.    ROS:  As noted above otherwise negative.    Patient Active Problem List   Diagnosis    Asthma    Disorder of bilirubin excretion    History of abnormal cervical Pap smear    Insomnia    Mild intermittent asthma without complication    Episodic mood disorder (H24)    Sprain of posterior talofibular ligament of left ankle, initial encounter    Sprain of tibiofibular ligament of left ankle, initial encounter    Peroneal tendinitis of left lower extremity       Current Outpatient Medications   Medication Sig Dispense Refill    levalbuterol (XOPENEX HFA) 45 MCG/ACT inhaler Inhale 1-2 puffs into the lungs every 6 hours as needed for shortness of breath or wheezing 15 g 3    paragard intrauterine copper device 1 each by Intrauterine route once              OBJECTIVE:       Vitals: There were no vitals filed for this visit.  BMI: There is no height or weight on file to calculate BMI.    Gen:  Well nourished and in no acute distress  HEENT: Extraocular movement intact  Neck: Supple  Pulm:  Breathing Comfortably. No increased respiratory effort.  Psych: Euthymic. Appropriately answers questions    MSK: Ankle without palpable tenderness.  No overlying skin changes.  No effusions.  Full range of motion with accompanying  strength.  Negative talar tilt, dorsiflexion eversion, and anterior drawer.              ASSESSMENT and PLAN:     Yanni was seen today for pain.    Diagnoses and all orders for this visit:    Sprain of anterior talofibular ligament of left ankle, initial encounter    Sprain of posterior talofibular ligament of left ankle, initial encounter    Sprain of tibiofibular ligament of left ankle, initial encounter    Peroneal tendinitis of left lower extremity      33-year-old female returning to clinic today for follow-up of the left ankle.  Patient has substantially improved her functionality as well as decreased her pain with the guidance of physical therapy.  At this point, the patient should continue her PT recommendations.  I am fine if she starts to get on the treadmill and increase her activity as tolerated.  Any pain would be an indicator that she needs to cease.  She can follow-up in our clinic as needed at this point.  Return precautions have been advised.    Options for treatment and/or follow-up care were reviewed with the patient was actively involved in the decision making process. Patient verbalized understanding and was in agreement with the plan.    Berto Dominique DO  , Sports Medicine  Department of Family Medicine and [facility]

## 2023-12-01 ENCOUNTER — OFFICE VISIT (OUTPATIENT)
Dept: ORTHOPEDICS | Facility: CLINIC | Age: 33
End: 2023-12-01
Payer: COMMERCIAL

## 2023-12-01 DIAGNOSIS — S93.432A SPRAIN OF TIBIOFIBULAR LIGAMENT OF LEFT ANKLE, INITIAL ENCOUNTER: ICD-10-CM

## 2023-12-01 DIAGNOSIS — S93.492A SPRAIN OF POSTERIOR TALOFIBULAR LIGAMENT OF LEFT ANKLE, INITIAL ENCOUNTER: ICD-10-CM

## 2023-12-01 DIAGNOSIS — M76.72 PERONEAL TENDINITIS OF LEFT LOWER EXTREMITY: ICD-10-CM

## 2023-12-01 DIAGNOSIS — S93.492A SPRAIN OF ANTERIOR TALOFIBULAR LIGAMENT OF LEFT ANKLE, INITIAL ENCOUNTER: Primary | ICD-10-CM

## 2023-12-01 PROCEDURE — 99213 OFFICE O/P EST LOW 20 MIN: CPT | Performed by: STUDENT IN AN ORGANIZED HEALTH CARE EDUCATION/TRAINING PROGRAM

## 2023-12-01 NOTE — LETTER
12/1/2023      RE: Yanni Mckenna  [address] [facility] [address]     Dear Colleague,    Thank you for referring your patient, Yanni Mckenna, to the [facility] SPORTS MEDICINE CLINIC [place]. Please see a copy of my visit note below.    [facility]  Sports Medicine Clinic  Clinics and Surgery Center           SUBJECTIVE       Yanni Mckenna is a 33 year old female presenting to clinic today for follow-up of the left ankle and is overall doing very well.  Continued in physical therapy with no pain.  Beginning to gradually increase her exercises, and hopeful to get back on the treadmill very soon.  No swelling or instability.    10/20/23: Sprain of anterior talofibular ligament of left ankle, initial encounter  -     Physical Therapy Referral; Future     Sprain of posterior talofibular ligament of left ankle, initial encounter  -     Physical Therapy Referral; Future     Sprain of tibiofibular ligament of left ankle, initial encounter  -     Physical Therapy Referral; Future     Peroneal tendinitis of left lower extremity  -     Physical Therapy Referral; Future        33-year-old female return to clinic today for the above diagnosis, roughly 2 weeks into injury and recommendations for immobilization.  The patient did not wear the cam boot very long, which I do think would have expedited her recovery.  Given the fact that she has some peroneal tendinosis/tendinitis, this sometimes can take up to 4 to 6 weeks before full resolution has presented.  However I do not think the patient will continue to wear the cam boot regularly, given the level of activity, we have provided her with a lace up ankle brace today for some form of stability while she is healing.  I have also placed the patient in physical therapy as well.  The patient is having any return of pain to the region of the peroneal tendons, I would want her to return to the cam boot.  She does have a trip coming up when she is  leaving for [place] for 2 weeks, she says she is not doing any hiking, but I have recommended she bring the boot and the brace with her.  I will see her back in 6 to 8 weeks, after starting physical therapy to assess how she is doing.  If any regression, would consider advanced imaging.  ER and urgent care precautions advised.  Return precautions advised.    Study Result    Narrative & Impression   3 views left ankle radiographs 10/5/2023 11:08 AM     History: Left ankle pain     Comparison: none     Findings:     Standing AP, oblique, and lateral  views of the left ankle were  obtained.      No acute osseous abnormality.       Ankle mortise and syndesmosis are congruent. No talar dome lesion.     Soft tissue is unremarkable.                                                                      Impression:  1. No acute osseous abnormality.  2. No substantial degenerative change.        PMH, Medications and Allergies were reviewed and updated as needed.    ROS:  As noted above otherwise negative.    Patient Active Problem List   Diagnosis     Asthma     Disorder of bilirubin excretion     History of abnormal cervical Pap smear     Insomnia     Mild intermittent asthma without complication     Episodic mood disorder (H24)     Sprain of posterior talofibular ligament of left ankle, initial encounter     Sprain of tibiofibular ligament of left ankle, initial encounter     Peroneal tendinitis of left lower extremity       Current Outpatient Medications   Medication Sig Dispense Refill     levalbuterol (XOPENEX HFA) 45 MCG/ACT inhaler Inhale 1-2 puffs into the lungs every 6 hours as needed for shortness of breath or wheezing 15 g 3     paragard intrauterine copper device 1 each by Intrauterine route once              OBJECTIVE:       Vitals: There were no vitals filed for this visit.  BMI: There is no height or weight on file to calculate BMI.    Gen:  Well nourished and in no acute distress  HEENT: Extraocular movement  intact  Neck: Supple  Pulm:  Breathing Comfortably. No increased respiratory effort.  Psych: Euthymic. Appropriately answers questions    MSK: Ankle without palpable tenderness.  No overlying skin changes.  No effusions.  Full range of motion with accompanying strength.  Negative talar tilt, dorsiflexion eversion, and anterior drawer.              ASSESSMENT and PLAN:     Yanni was seen today for pain.    Diagnoses and all orders for this visit:    Sprain of anterior talofibular ligament of left ankle, initial encounter    Sprain of posterior talofibular ligament of left ankle, initial encounter    Sprain of tibiofibular ligament of left ankle, initial encounter    Peroneal tendinitis of left lower extremity      33-year-old female returning to clinic today for follow-up of the left ankle.  Patient has substantially improved her functionality as well as decreased her pain with the guidance of physical therapy.  At this point, the patient should continue her PT recommendations.  I am fine if she starts to get on the treadmill and increase her activity as tolerated.  Any pain would be an indicator that she needs to cease.  She can follow-up in our clinic as needed at this point.  Return precautions have been advised.    Options for treatment and/or follow-up care were reviewed with the patient was actively involved in the decision making process. Patient verbalized understanding and was in agreement with the plan.    Berto Dominique DO  , Sports Medicine  Department of Family Medicine and [facility]      Again, thank you for allowing me to participate in the care of your patient.      Sincerely,    Berto Dominique DO

## 2023-12-05 ENCOUNTER — THERAPY VISIT (OUTPATIENT)
Dept: PHYSICAL THERAPY | Facility: CLINIC | Age: 33
End: 2023-12-05
Payer: COMMERCIAL

## 2023-12-05 DIAGNOSIS — S93.492A SPRAIN OF POSTERIOR TALOFIBULAR LIGAMENT OF LEFT ANKLE, INITIAL ENCOUNTER: Primary | ICD-10-CM

## 2023-12-05 DIAGNOSIS — S93.432A SPRAIN OF TIBIOFIBULAR LIGAMENT OF LEFT ANKLE, INITIAL ENCOUNTER: ICD-10-CM

## 2023-12-05 DIAGNOSIS — M76.72 PERONEAL TENDINITIS OF LEFT LOWER EXTREMITY: ICD-10-CM

## 2023-12-05 PROCEDURE — 97110 THERAPEUTIC EXERCISES: CPT | Mod: GP

## 2023-12-05 PROCEDURE — 97112 NEUROMUSCULAR REEDUCATION: CPT | Mod: GP

## 2023-12-15 ENCOUNTER — DOCUMENTATION ONLY (OUTPATIENT)
Dept: ORTHOPEDICS | Facility: CLINIC | Age: 33
End: 2023-12-15
Payer: COMMERCIAL

## 2023-12-15 DIAGNOSIS — S93.432D SPRAIN OF TIBIOFIBULAR LIGAMENT OF LEFT ANKLE, SUBSEQUENT ENCOUNTER: Primary | ICD-10-CM

## 2023-12-15 DIAGNOSIS — S93.492D SPRAIN OF OTHER LIGAMENT OF LEFT ANKLE, SUBSEQUENT ENCOUNTER: ICD-10-CM

## 2023-12-15 DIAGNOSIS — M76.72 PERONEAL TENDINITIS, LEFT LEG: ICD-10-CM

## 2024-01-06 ENCOUNTER — MYC REFILL (OUTPATIENT)
Dept: OBGYN | Facility: CLINIC | Age: 34
End: 2024-01-06
Payer: COMMERCIAL

## 2024-01-06 DIAGNOSIS — J45.20 MILD INTERMITTENT ASTHMA WITHOUT COMPLICATION: ICD-10-CM

## 2024-01-11 RX ORDER — LEVALBUTEROL TARTRATE 45 UG/1
1-2 AEROSOL, METERED ORAL EVERY 6 HOURS PRN
Qty: 15 G | Refills: 0 | Status: SHIPPED | OUTPATIENT
Start: 2024-01-11 | End: 2024-06-06

## 2024-01-17 ENCOUNTER — THERAPY VISIT (OUTPATIENT)
Dept: PHYSICAL THERAPY | Facility: CLINIC | Age: 34
End: 2024-01-17
Payer: COMMERCIAL

## 2024-01-17 DIAGNOSIS — S93.432A SPRAIN OF TIBIOFIBULAR LIGAMENT OF LEFT ANKLE, INITIAL ENCOUNTER: ICD-10-CM

## 2024-01-17 DIAGNOSIS — M76.72 PERONEAL TENDINITIS OF LEFT LOWER EXTREMITY: ICD-10-CM

## 2024-01-17 DIAGNOSIS — S93.492A SPRAIN OF POSTERIOR TALOFIBULAR LIGAMENT OF LEFT ANKLE, INITIAL ENCOUNTER: Primary | ICD-10-CM

## 2024-01-17 PROCEDURE — 97110 THERAPEUTIC EXERCISES: CPT | Mod: GP

## 2024-01-17 PROCEDURE — 97112 NEUROMUSCULAR REEDUCATION: CPT | Mod: GP

## 2024-02-01 ENCOUNTER — TELEPHONE (OUTPATIENT)
Dept: ORTHOPEDICS | Facility: CLINIC | Age: 34
End: 2024-02-01
Payer: COMMERCIAL

## 2024-02-01 NOTE — TELEPHONE ENCOUNTER
[facility] Call Center    Phone Message    May a detailed message be left on voicemail: no     Reason for Call: Requesting c/b- regarding ankle stabilizer bardales she got on 10/05. Under the impression she wouldn't be billed for this. Said she had spoke w/the ATC before getting the boot and was told she wouldn't have to pay for it and it would be covered by insurance, but it was not.

## 2024-02-01 NOTE — TELEPHONE ENCOUNTER
HELENE was able to speak with patient regarding her concerns for the charge of walking boot that was administered at her appointment on 10/5/2023 with Dr. Berto Dominique.     The patient explained that at the time of her appointment, she was fitted for a short walking boot for her left ankle sprain. She states she asked Dr. Dominique clinic staff if she would have to pay for the boot. She states she was informed that her insurance would cover the cost of the boot. She explains that she received a bill in the mail today from [place] for this charge. The patient expressed frustration as she was told differently at the time of her appointment.    She mentions she has spoken to the business office who informed her that charges are appropriate and correct and she should discuss further with Dr. Dominique's clinic.     ATC expressed her apologizes for the miscommunication. HELENE further explained that when the patient signed the consent form for the walking boot, she was consenting that if her insurance does not cover the cost of the walking boot than she would be responsible for the remaining out of pocket expense and we are unable to accept the medical device back. The patient does not recall signing the consent form.    HELENE explained that she will inform the clinic supervisor regarding her concerns and to further address the charges. HELENE informed the patient that clinic supervisor may be able to contact patient tomorrow (2/2/2024) but likely it will be next week.    The patient understood and was appreciative of the return call.    HELENE Webster

## 2024-02-08 NOTE — TELEPHONE ENCOUNTER
Patient was contacted today by the clinic leader to discuss the bill she received for a walking boot.

## 2024-04-03 ASSESSMENT — ANXIETY QUESTIONNAIRES
7. FEELING AFRAID AS IF SOMETHING AWFUL MIGHT HAPPEN: NOT AT ALL
5. BEING SO RESTLESS THAT IT IS HARD TO SIT STILL: NOT AT ALL
3. WORRYING TOO MUCH ABOUT DIFFERENT THINGS: SEVERAL DAYS
2. NOT BEING ABLE TO STOP OR CONTROL WORRYING: SEVERAL DAYS
GAD7 TOTAL SCORE: 5
4. TROUBLE RELAXING: SEVERAL DAYS
1. FEELING NERVOUS, ANXIOUS, OR ON EDGE: SEVERAL DAYS
6. BECOMING EASILY ANNOYED OR IRRITABLE: SEVERAL DAYS
IF YOU CHECKED OFF ANY PROBLEMS ON THIS QUESTIONNAIRE, HOW DIFFICULT HAVE THESE PROBLEMS MADE IT FOR YOU TO DO YOUR WORK, TAKE CARE OF THINGS AT HOME, OR GET ALONG WITH OTHER PEOPLE: SOMEWHAT DIFFICULT

## 2024-04-16 NOTE — PROGRESS NOTES
Progress Note    SUBJECTIVE:  Yanni Mckenna is an 34 year old, , who requests an Annual Preventive Exam.     Concerns today include:     Recurrent yeast infections: First episode started in  - treated with Diflucan. Symptoms seemed to have resolved for some time but then returned. In 2022, she tested positive for BV. Yeast infection-like symptoms are coming back every month (cyclic with her period, in the luteal phase); she experiences vaginal itchiness as primary symptom. Denies change in vaginal discharge or vaginal odor.  Symptoms typically resolve with the onset of her period. Does find that showering in the morning and after a workout and sleeping without underwear also seem to mildly help the symptoms. It's becoming bothersome and affects her quality of life.  She drinks combucha and eats yogurt, and had tried probiotic supplements but they were not helpful.     Sexual hx:  - Contraception: Paraguard IUD placed 10/2021 - likes this method as it's nonhormonal   - Sexually active with 1 partner; had multiple partners last year  - No STD concerns but open to STD testing.  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 She moved here in 2017; has her own house, lives alone with a poodle Northern Irish dog, and feels safe at home.    Exercise: plays ultimate frisbee in the summer. Tries to run at least once a week and daily 20-30 min walks with her dog.     Diet: Feels she gets a pretty good variety of foods. She prioritizes eating more vegetables and lean protein; chicken and turkey. Usually eats fruits every day, eats a banana every morning with nut butter.    Mental health:  - Has anxiety and depression episodes and intense volatile moods. She attends therapy regularly  and really likes her therapist. Symptoms seem to worsen leading up to her menses. She currently is using lifestyle management as an intervention to help with her anxiety and moods. She has trouble staying asleep but not falling asleep; takes trazodone at night and it's been working well for her. Requests refill today.  She has taken combined hormonal birth control in the past but didn't find this to be a good fit for her.       Recent Labs   Lab Test 10/29/19  0958   CHOL 138   HDL 63   LDL 55   TRIG 98     Menstrual History:      10/26/2021    11:00 AM 10/26/2021    11:23 AM 4/18/2024     9:20 AM   Menstrual History   LAST MENSTRUAL PERIOD 10/10/2021  4/6/2024   Period Cycle (Days)  26    Period Duration (Days)  3-4    Method of Contraception  Mirena IUD    Period Pattern  Regular    Menstrual Flow  Light    Dysmenorrhea  Severe    PMS Symptoms  Mood Changes;Cramping    Reviewed Today  Yes        Last    Lab Results   Component Value Date    PAP NIL 10/29/2019     History of abnormal Pap smear: NO - age 30-65 PAP every 5 years with negative HPV co-testing recommended    Last   Lab Results   Component Value Date    HPV16 Negative 10/29/2019     Last   Lab Results   Component Value Date    HPV18 Negative 10/29/2019     Last   Lab Results   Component Value Date    HRHPV Negative 10/29/2019       Mammogram current: not applicable    Last Colonoscopy: n/a    HISTORY:  Current Outpatient Medications   Medication Sig Dispense Refill    levalbuterol (XOPENEX HFA) 45 MCG/ACT inhaler Inhale 1-2 puffs into the lungs every 6 hours as needed for shortness of breath or wheezing 15 g 0    paragard intrauterine copper device 1 each by Intrauterine route once      traZODone (DESYREL) 50 MG tablet Take 1 tablet (50 mg) by mouth at bedtime 90 tablet 3     No current facility-administered medications for this visit.     Allergies   Allergen Reactions    Amoxicillin Hives     Immunization History   Administered Date(s) Administered     COVID-19 MONOVALENT 12+ (Pfizer) 2021, 2021    HIB(PRP-OMP)(PedvaxHIB) 1990    HepB, Unspecified 10/29/1997, 1997, 1998    Hepatitis A (ADULT 19+) 2007, 2018    Historical DTP/aP 1989, 1989, 1989, 1990, 1994    Hpv, Unspecified  2007, 2007, 2008    Influenza (IIV3) PF 10/09/1994, 1994, 1994    Influenza Vaccine >6 months,quad, PF 2018, 10/29/2019, 2020, 10/26/2021, 2022    MMR 1990, 1994    Meningococcal Mcv4 Conjugate,unspecified  2007    Meningococcal,unspecified 2007    OPV, trivalent, live 1989, 1989, 1990, 1994    Pneumococcal 23 valent 04/15/2016    TD,PF 7+ (Tenivac) 1999    TDAP (Adacel,Boostrix) 2007, 2018    Typhoid IM 2018    Typhoid Oral 2018       OB History    Para Term  AB Living   0 0 0 0 0 0   SAB IAB Ectopic Multiple Live Births   0 0 0 0 0     Past Medical History:   Diagnosis Date    Asthma      Past Surgical History:   Procedure Laterality Date    NO HISTORY OF SURGERY       No family history on file.  Social History     Socioeconomic History    Marital status: Single   Occupational History    Occupation:    Tobacco Use    Smoking status: Never    Smokeless tobacco: Never   Substance and Sexual Activity    Alcohol use: Yes     Alcohol/week: 5.0 standard drinks of alcohol     Types: 5 Standard drinks or equivalent per week     Comment: 0-5 drinks per week    Drug use: Yes     Types: Marijuana    Sexual activity: Yes     Partners: Male     Birth control/protection: I.U.D.   Social History Narrative    How much exercise per week? Active daily, yoga 4-5 x's    How much calcium per day? In foods       How much caffeine per day? 1 cup coffee    How much vitamin D per day? Multivitamin    Do you/your family wear seatbelts?  Yes    Do you/your family use safety helmets? No    Do  "you/your family use sunscreen? Yes    Do you/your family keep firearms in the home? No    Do you/your family have a smoke detector(s)? Yes        November 5, 2020 Fco Jimenez LPN        How much exercise per week? 6 times per week    How much calcium per day? Through foods       How much caffeine per day? 2 cups coffee    How much vitamin D per day? Not much    Do you/your family wear seatbelts?  Yes    Do you/your family use safety helmets? Yes    Do you/your family use sunscreen? Yes    Do you/your family keep firearms in the home? No    Do you/your family have a smoke detector(s)? Yes        Reviewed by Jessica Leon 10-26-21               ROS   ROS: 10 point ROS neg other than the symptoms noted above in the HPI.        2/26/2019    10:40 AM 10/29/2019     9:56 AM 11/5/2020    10:29 AM   PHQ-9 SCORE   PHQ-9 Total Score 13 4 7   PHQ-2 Score:         4/18/2024     9:17 AM 10/5/2023    10:54 AM   PHQ-2 ( 1999 Pfizer)   Q1: Little interest or pleasure in doing things 0 0   Q2: Feeling down, depressed or hopeless 0 0   PHQ-2 Score 0 0   Q1: Little interest or pleasure in doing things Not at all    Q2: Feeling down, depressed or hopeless Not at all    PHQ-2 Score 0              11/5/2020    10:29 AM 4/3/2024     7:04 AM 4/18/2024     9:16 AM   TIMOTHY-7 SCORE   Total Score  5 (mild anxiety) 5 (mild anxiety)   Total Score 12 5    5 5     EXAM:  Blood pressure 124/83, pulse 51, height 1.702 m (5' 7\"), weight 65.8 kg (145 lb 1.6 oz), last menstrual period 04/06/2024, not currently breastfeeding. Body mass index is 22.73 kg/m .  General - pleasant female in no acute distress.  Skin - no suspicious lesions or rashes  EENT-  euthyroid with out palpable nodules  Neck - supple without lymphadenopathy.  Lungs - clear to auscultation bilaterally.  Heart - regular rate and rhythm without murmur.  Abdomen - soft, nontender, nondistended, no masses or organomegaly noted.  Musculoskeletal - no gross deformities.  Neurological - " normal strength, sensation, and mental status.    Breast Exam:  Breast: Without visible skin changes. No dimpling or lesions seen.   Breasts supple, non-tender with palpation, no dominant mass, nodularity, or nipple discharge noted bilaterally. Axillary nodes negative.      Pelvic Exam:  EG/BUS: Normal genital architecture without lesions, erythema or abnormal secretions; Bartholin's, Urethra, [place]. Simons's normal   Urethral meatus: normal   Urethra: no masses, tenderness, or scarring   Bladder: no masses or tenderness   Vagina: moist, pink, rugae with creamy, white, and odorless  secretions  Cervix: no lesions, pink, moist, closed; IUD strings present    ASSESSMENT/ PLAN:  1. Visit for preventive health examination  - Gynecologic Cytology (PAP Smear)  - Lipid Profile; Future  - Hemoglobin A1c; Future  - HIV Antigen Antibody Combo; Future  - Treponema Abs w Reflex to RPR and Titer; Future    2. Screening for cervical cancer  - Gynecologic Cytology (PAP Smear)    3. Vaginal itching  - Multiplex Vaginal Panel by PCR  No current symptoms. Will treat based on results. If normal, recommend pt return to clinic when symptoms are present for repeat evaluation.     4. Screen for STD (sexually transmitted disease)  - Neisseria gonorrhoeae PCR  - Chlamydia trachomatis PCR  - HIV Antigen Antibody Combo; Future  - Treponema Abs w Reflex to RPR and Titer; Future    5. Screening for diabetes mellitus  Drawn due to concern for repeat yeast infections  - Hemoglobin A1c; Future    6. Screening for lipid disorders  - Lipid Profile; Future    7. Insomnia, unspecified type  Currently well manage with Trazodone; refill provided  - traZODone (DESYREL) 50 MG tablet; Take 1 tablet (50 mg) by mouth at bedtime  Dispense: 90 tablet; Refill: 3    8. Anxiety and depression  - traZODone (DESYREL) 50 MG tablet; Take 1 tablet (50 mg) by mouth at bedtime  Dispense: 90 tablet; Refill: 3  Pt feels she is able to function with her current symptoms and is  self-aware of how she is feeling and proactive in taking measures to improve symptoms. Counseled pt on option for selective serotonin reuptake inhibitor/ SNRI or hormonal management with a contraceptive option to keep hormones stable throughout the month, as her symptoms seem to worsen in the luteal phase. She is not interested in pharmacologic intervention at this time; desires an integrative approach and to look into hormonal management in other ways.     Additional teaching done at this visit regarding self breast awareness.    Return to clinic in one year.  Follow-up as needed.    I, Katya nA, completed the PFSH and ROS. I then acted as a scribe for MELODIE Padilla, for the remainder of the visit.  Katya An, YAON, RN, BELLE Student    I agree with the PFSH and ROS as completed by the MELODIE Student, except for changes made by me.  The remainder of the encounter was performed by me and scribed by the MELODIE Student.  The scribed note accurately reflects my personal services and decisions made by me.  Susana Rojas DNP, APRN, MELODIE

## 2024-04-18 ENCOUNTER — LAB (OUTPATIENT)
Dept: LAB | Facility: CLINIC | Age: 34
End: 2024-04-18
Attending: NURSE PRACTITIONER
Payer: COMMERCIAL

## 2024-04-18 ENCOUNTER — OFFICE VISIT (OUTPATIENT)
Dept: OBGYN | Facility: CLINIC | Age: 34
End: 2024-04-18
Attending: NURSE PRACTITIONER
Payer: COMMERCIAL

## 2024-04-18 VITALS
HEIGHT: 67 IN | BODY MASS INDEX: 22.78 KG/M2 | SYSTOLIC BLOOD PRESSURE: 124 MMHG | WEIGHT: 145.1 LBS | HEART RATE: 51 BPM | DIASTOLIC BLOOD PRESSURE: 83 MMHG

## 2024-04-18 DIAGNOSIS — Z00.00 VISIT FOR PREVENTIVE HEALTH EXAMINATION: Primary | ICD-10-CM

## 2024-04-18 DIAGNOSIS — Z13.1 SCREENING FOR DIABETES MELLITUS: ICD-10-CM

## 2024-04-18 DIAGNOSIS — Z13.220 SCREENING FOR LIPID DISORDERS: ICD-10-CM

## 2024-04-18 DIAGNOSIS — F41.9 ANXIETY AND DEPRESSION: ICD-10-CM

## 2024-04-18 DIAGNOSIS — Z12.4 SCREENING FOR CERVICAL CANCER: ICD-10-CM

## 2024-04-18 DIAGNOSIS — Z11.3 SCREEN FOR STD (SEXUALLY TRANSMITTED DISEASE): ICD-10-CM

## 2024-04-18 DIAGNOSIS — Z00.00 VISIT FOR PREVENTIVE HEALTH EXAMINATION: ICD-10-CM

## 2024-04-18 DIAGNOSIS — F32.A ANXIETY AND DEPRESSION: ICD-10-CM

## 2024-04-18 DIAGNOSIS — G47.00 INSOMNIA, UNSPECIFIED TYPE: ICD-10-CM

## 2024-04-18 DIAGNOSIS — N89.8 VAGINAL ITCHING: ICD-10-CM

## 2024-04-18 LAB
BACTERIAL VAGINOSIS VAG-IMP: POSITIVE
CANDIDA DNA VAG QL NAA+PROBE: DETECTED
CANDIDA GLABRATA / CANDIDA KRUSEI DNA: NOT DETECTED
CHOLEST SERPL-MCNC: 150 MG/DL
FASTING STATUS PATIENT QL REPORTED: YES
HBA1C MFR BLD: 4.6 %
HDLC SERPL-MCNC: 63 MG/DL
LDLC SERPL CALC-MCNC: 62 MG/DL
NONHDLC SERPL-MCNC: 87 MG/DL
T PALLIDUM AB SER QL: NONREACTIVE
T VAGINALIS DNA VAG QL NAA+PROBE: NOT DETECTED
TRIGL SERPL-MCNC: 123 MG/DL

## 2024-04-18 PROCEDURE — 99395 PREV VISIT EST AGE 18-39: CPT | Performed by: NURSE PRACTITIONER

## 2024-04-18 PROCEDURE — 87389 HIV-1 AG W/HIV-1&-2 AB AG IA: CPT

## 2024-04-18 PROCEDURE — 0352U MULTIPLEX VAGINAL PANEL BY PCR: CPT | Performed by: NURSE PRACTITIONER

## 2024-04-18 PROCEDURE — 87624 HPV HI-RISK TYP POOLED RSLT: CPT | Performed by: NURSE PRACTITIONER

## 2024-04-18 PROCEDURE — 87591 N.GONORRHOEAE DNA AMP PROB: CPT | Performed by: NURSE PRACTITIONER

## 2024-04-18 PROCEDURE — 86780 TREPONEMA PALLIDUM: CPT

## 2024-04-18 PROCEDURE — 36415 COLL VENOUS BLD VENIPUNCTURE: CPT

## 2024-04-18 PROCEDURE — 80061 LIPID PANEL: CPT

## 2024-04-18 PROCEDURE — 99214 OFFICE O/P EST MOD 30 MIN: CPT | Mod: 25 | Performed by: NURSE PRACTITIONER

## 2024-04-18 PROCEDURE — 83036 HEMOGLOBIN GLYCOSYLATED A1C: CPT

## 2024-04-18 PROCEDURE — 99213 OFFICE O/P EST LOW 20 MIN: CPT | Performed by: NURSE PRACTITIONER

## 2024-04-18 PROCEDURE — 87491 CHLMYD TRACH DNA AMP PROBE: CPT | Performed by: NURSE PRACTITIONER

## 2024-04-18 PROCEDURE — G0145 SCR C/V CYTO,THINLAYER,RESCR: HCPCS | Performed by: NURSE PRACTITIONER

## 2024-04-18 RX ORDER — TRAZODONE HYDROCHLORIDE 50 MG/1
50 TABLET, FILM COATED ORAL AT BEDTIME
COMMUNITY
Start: 2024-03-25 | End: 2024-04-18

## 2024-04-18 RX ORDER — TRAZODONE HYDROCHLORIDE 50 MG/1
50 TABLET, FILM COATED ORAL AT BEDTIME
Qty: 90 TABLET | Refills: 3 | Status: SHIPPED | OUTPATIENT
Start: 2024-04-18

## 2024-04-18 ASSESSMENT — ANXIETY QUESTIONNAIRES
4. TROUBLE RELAXING: SEVERAL DAYS
5. BEING SO RESTLESS THAT IT IS HARD TO SIT STILL: NOT AT ALL
GAD7 TOTAL SCORE: 5
2. NOT BEING ABLE TO STOP OR CONTROL WORRYING: SEVERAL DAYS
1. FEELING NERVOUS, ANXIOUS, OR ON EDGE: SEVERAL DAYS
3. WORRYING TOO MUCH ABOUT DIFFERENT THINGS: SEVERAL DAYS
6. BECOMING EASILY ANNOYED OR IRRITABLE: SEVERAL DAYS
7. FEELING AFRAID AS IF SOMETHING AWFUL MIGHT HAPPEN: NOT AT ALL
IF YOU CHECKED OFF ANY PROBLEMS ON THIS QUESTIONNAIRE, HOW DIFFICULT HAVE THESE PROBLEMS MADE IT FOR YOU TO DO YOUR WORK, TAKE CARE OF THINGS AT HOME, OR GET ALONG WITH OTHER PEOPLE: SOMEWHAT DIFFICULT
7. FEELING AFRAID AS IF SOMETHING AWFUL MIGHT HAPPEN: NOT AT ALL
8. IF YOU CHECKED OFF ANY PROBLEMS, HOW DIFFICULT HAVE THESE MADE IT FOR YOU TO DO YOUR WORK, TAKE CARE OF THINGS AT HOME, OR GET ALONG WITH OTHER PEOPLE?: SOMEWHAT DIFFICULT
GAD7 TOTAL SCORE: 5

## 2024-04-18 NOTE — PATIENT INSTRUCTIONS
Thank you for trusting us with your care!     If you need to contact us for questions about:  Symptoms, Scheduling & Medical Questions; Non-urgent (2-3 day response) Yari message, Urgent (needing response today) [phone] (if after 3:30pm next day response)   Prescriptions: Please call your Pharmacy   Billing: [facility] [phone] or [facility]:[phone]

## 2024-04-18 NOTE — LETTER
2024       RE: Yanni Mckenna  [address] [facility] [address]     Dear Colleague,    Thank you for referring your patient, Yanni Mckenna, to the [facility] [place] at [facility]. Please see a copy of my visit note below.    Progress Note    SUBJECTIVE:  Yanni Mckenna is an 34 year old, , who requests an Annual Preventive Exam.     Concerns today include:     Recurrent yeast infections: First episode started in  - treated with Diflucan. Symptoms seemed to have resolved for some time but then returned. In 2022, she tested positive for BV. Yeast infection-like symptoms are coming back every month (cyclic with her period, in the luteal phase); she experiences vaginal itchiness as primary symptom. Denies change in vaginal discharge or vaginal odor.  Symptoms typically resolve with the onset of her period. Does find that showering in the morning and after a workout and sleeping without underwear also seem to mildly help the symptoms. It's becoming bothersome and affects her quality of life.  She drinks combucha and eats yogurt, and had tried probiotic supplements but they were not helpful.     Sexual hx:  - Contraception: Paraguard IUD placed 10/2021 - likes this method as it's nonhormonal   - Sexually active with 1 partner; had multiple partners last year  - No STD concerns but open to STD testing.  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 She moved here in 2017; has her own house, lives alone with a poodle Citizen of [place] dog, and feels safe at home.    Exercise: plays ultimate frisbee in the summer. Tries to run at least once a week and daily 20-30 min walks with her dog.     Diet: Feels  she gets a pretty good variety of foods. She prioritizes eating more vegetables and lean protein; chicken and turkey. Usually eats fruits every day, eats a banana every morning with nut butter.    Mental health:  - Has anxiety and depression episodes and intense volatile moods. She attends therapy regularly and really likes her therapist. Symptoms seem to worsen leading up to her menses. She currently is using lifestyle management as an intervention to help with her anxiety and moods. She has trouble staying asleep but not falling asleep; takes trazodone at night and it's been working well for her. Requests refill today.  She has taken combined hormonal birth control in the past but didn't find this to be a good fit for her.       Recent Labs   Lab Test 10/29/19  0958   CHOL 138   HDL 63   LDL 55   TRIG 98     Menstrual History:      10/26/2021    11:00 AM 10/26/2021    11:23 AM 4/18/2024     9:20 AM   Menstrual History   LAST MENSTRUAL PERIOD 10/10/2021  4/6/2024   Period Cycle (Days)  26    Period Duration (Days)  3-4    Method of Contraception  Mirena IUD    Period Pattern  Regular    Menstrual Flow  Light    Dysmenorrhea  Severe    PMS Symptoms  Mood Changes;Cramping    Reviewed Today  Yes        Last    Lab Results   Component Value Date    PAP NIL 10/29/2019     History of abnormal Pap smear: NO - age 30-65 PAP every 5 years with negative HPV co-testing recommended    Last   Lab Results   Component Value Date    HPV16 Negative 10/29/2019     Last   Lab Results   Component Value Date    HPV18 Negative 10/29/2019     Last   Lab Results   Component Value Date    HRHPV Negative 10/29/2019       Mammogram current: not applicable    Last Colonoscopy: n/a    HISTORY:  Current Outpatient Medications   Medication Sig Dispense Refill    levalbuterol (XOPENEX HFA) 45 MCG/ACT inhaler Inhale 1-2 puffs into the lungs every 6 hours as needed for shortness of breath or wheezing 15 g 0    paragard intrauterine copper device 1  each by Intrauterine route once      traZODone (DESYREL) 50 MG tablet Take 1 tablet (50 mg) by mouth at bedtime 90 tablet 3     No current facility-administered medications for this visit.     Allergies   Allergen Reactions    Amoxicillin Hives     Immunization History   Administered Date(s) Administered    COVID-19 MONOVALENT 12+ (Pfizer) 2021, 2021    HIB(PRP-OMP)(PedvaxHIB) 1990    HepB, Unspecified 10/29/1997, 1997, 1998    Hepatitis A (ADULT 19+) 2007, 2018    Historical DTP/aP 1989, 1989, 1989, 1990, 1994    Hpv, Unspecified  2007, 2007, 2008    Influenza (IIV3) PF 10/09/1994, 1994, 1994    Influenza Vaccine >6 months,quad, PF 2018, 10/29/2019, 2020, 10/26/2021, 2022    MMR 1990, 1994    Meningococcal Mcv4 Conjugate,unspecified  2007    Meningococcal,unspecified 2007    OPV, trivalent, live 1989, 1989, 1990, 1994    Pneumococcal 23 valent 04/15/2016    TD,PF 7+ (Tenivac) 1999    TDAP (Adacel,Boostrix) 2007, 2018    Typhoid IM 2018    Typhoid Oral 2018       OB History    Para Term  AB Living   0 0 0 0 0 0   SAB IAB Ectopic Multiple Live Births   0 0 0 0 0     Past Medical History:   Diagnosis Date    Asthma      Past Surgical History:   Procedure Laterality Date    NO HISTORY OF SURGERY       No family history on file.  Social History     Socioeconomic History    Marital status: Single   Occupational History    Occupation:    Tobacco Use    Smoking status: Never    Smokeless tobacco: Never   Substance and Sexual Activity    Alcohol use: Yes     Alcohol/week: 5.0 standard drinks of alcohol     Types: 5 Standard drinks or equivalent per week     Comment: 0-5 drinks per week    Drug use: Yes     Types: Marijuana    Sexual activity: Yes     Partners: Male     Birth control/protection: I.U.D.  "  Social History Narrative    How much exercise per week? Active daily, yoga 4-5 x's    How much calcium per day? In foods       How much caffeine per day? 1 cup coffee    How much vitamin D per day? Multivitamin    Do you/your family wear seatbelts?  Yes    Do you/your family use safety helmets? No    Do you/your family use sunscreen? Yes    Do you/your family keep firearms in the home? No    Do you/your family have a smoke detector(s)? Yes        November 5, 2020 Fco Jimenez LPN        How much exercise per week? 6 times per week    How much calcium per day? Through foods       How much caffeine per day? 2 cups coffee    How much vitamin D per day? Not much    Do you/your family wear seatbelts?  Yes    Do you/your family use safety helmets? Yes    Do you/your family use sunscreen? Yes    Do you/your family keep firearms in the home? No    Do you/your family have a smoke detector(s)? Yes        Reviewed by Jessica Leon 10-26-21               ROS   ROS: 10 point ROS neg other than the symptoms noted above in the HPI.        2/26/2019    10:40 AM 10/29/2019     9:56 AM 11/5/2020    10:29 AM   PHQ-9 SCORE   PHQ-9 Total Score 13 4 7   PHQ-2 Score:         4/18/2024     9:17 AM 10/5/2023    10:54 AM   PHQ-2 ( 1999 Pfizer)   Q1: Little interest or pleasure in doing things 0 0   Q2: Feeling down, depressed or hopeless 0 0   PHQ-2 Score 0 0   Q1: Little interest or pleasure in doing things Not at all    Q2: Feeling down, depressed or hopeless Not at all    PHQ-2 Score 0              11/5/2020    10:29 AM 4/3/2024     7:04 AM 4/18/2024     9:16 AM   TIMOTHY-7 SCORE   Total Score  5 (mild anxiety) 5 (mild anxiety)   Total Score 12 5    5 5     EXAM:  Blood pressure 124/83, pulse 51, height 1.702 m (5' 7\"), weight 65.8 kg (145 lb 1.6 oz), last menstrual period 04/06/2024, not currently breastfeeding. Body mass index is 22.73 kg/m .  General - pleasant female in no acute distress.  Skin - no suspicious lesions or rashes  EENT- "  euthyroid with out palpable nodules  Neck - supple without lymphadenopathy.  Lungs - clear to auscultation bilaterally.  Heart - regular rate and rhythm without murmur.  Abdomen - soft, nontender, nondistended, no masses or organomegaly noted.  Musculoskeletal - no gross deformities.  Neurological - normal strength, sensation, and mental status.    Breast Exam:  Breast: Without visible skin changes. No dimpling or lesions seen.   Breasts supple, non-tender with palpation, no dominant mass, nodularity, or nipple discharge noted bilaterally. Axillary nodes negative.      Pelvic Exam:  EG/BUS: Normal genital architecture without lesions, erythema or abnormal secretions; Bartholin's, Urethra, [place]'s normal   Urethral meatus: normal   Urethra: no masses, tenderness, or scarring   Bladder: no masses or tenderness   Vagina: moist, pink, rugae with creamy, white, and odorless  secretions  Cervix: no lesions, pink, moist, closed; IUD strings present    ASSESSMENT/ PLAN:  1. Visit for preventive health examination  - Gynecologic Cytology (PAP Smear)  - Lipid Profile; Future  - Hemoglobin A1c; Future  - HIV Antigen Antibody Combo; Future  - Treponema Abs w Reflex to RPR and Titer; Future    2. Screening for cervical cancer  - Gynecologic Cytology (PAP Smear)    3. Vaginal itching  - Multiplex Vaginal Panel by PCR  No current symptoms. Will treat based on results. If normal, recommend pt return to clinic when symptoms are present for repeat evaluation.     4. Screen for STD (sexually transmitted disease)  - Neisseria gonorrhoeae PCR  - Chlamydia trachomatis PCR  - HIV Antigen Antibody Combo; Future  - Treponema Abs w Reflex to RPR and Titer; Future    5. Screening for diabetes mellitus  Drawn due to concern for repeat yeast infections  - Hemoglobin A1c; Future    6. Screening for lipid disorders  - Lipid Profile; Future    7. Insomnia, unspecified type  Currently well manage with Trazodone; refill provided  - traZODone  (DESYREL) 50 MG tablet; Take 1 tablet (50 mg) by mouth at bedtime  Dispense: 90 tablet; Refill: 3    8. Anxiety and depression  - traZODone (DESYREL) 50 MG tablet; Take 1 tablet (50 mg) by mouth at bedtime  Dispense: 90 tablet; Refill: 3  Pt feels she is able to function with her current symptoms and is self-aware of how she is feeling and proactive in taking measures to improve symptoms. Counseled pt on option for selective serotonin reuptake inhibitor/ SNRI or hormonal management with a contraceptive option to keep hormones stable throughout the month, as her symptoms seem to worsen in the luteal phase. She is not interested in pharmacologic intervention at this time; desires an integrative approach and to look into hormonal management in other ways.     Additional teaching done at this visit regarding self breast awareness.    Return to clinic in one year.  Follow-up as needed.    I, Katya An, completed the PFSH and ROS. I then acted as a scribe for MELODIE Padilla, for the remainder of the visit.  Katya An, YAON, RN, DNP Student    I agree with the PFSH and ROS as completed by the MELODIE Student, except for changes made by me.  The remainder of the encounter was performed by me and scribed by the MELODIE Student.  The scribed note accurately reflects my personal services and decisions made by me.  Susana Rojas DNP, APRMELODIE PERALTA

## 2024-04-19 DIAGNOSIS — N76.0 BV (BACTERIAL VAGINOSIS): Primary | ICD-10-CM

## 2024-04-19 DIAGNOSIS — B37.31 VULVOVAGINAL CANDIDIASIS: ICD-10-CM

## 2024-04-19 DIAGNOSIS — B96.89 BV (BACTERIAL VAGINOSIS): Primary | ICD-10-CM

## 2024-04-19 LAB
C TRACH DNA SPEC QL NAA+PROBE: NEGATIVE
HIV 1+2 AB+HIV1 P24 AG SERPL QL IA: NONREACTIVE
N GONORRHOEA DNA SPEC QL NAA+PROBE: NEGATIVE

## 2024-04-19 RX ORDER — FLUCONAZOLE 150 MG/1
TABLET ORAL
Qty: 2 TABLET | Refills: 0 | Status: SHIPPED | OUTPATIENT
Start: 2024-04-19

## 2024-04-19 RX ORDER — METRONIDAZOLE 7.5 MG/G
1 GEL VAGINAL DAILY
Qty: 25 G | Refills: 0 | Status: SHIPPED | OUTPATIENT
Start: 2024-04-19 | End: 2024-04-24

## 2024-04-23 LAB
BKR LAB AP GYN ADEQUACY: NORMAL
BKR LAB AP GYN INTERPRETATION: NORMAL
BKR LAB AP HPV REFLEX: NORMAL
BKR LAB AP LMP: NORMAL
BKR LAB AP PREVIOUS ABNORMAL: NORMAL
PATH REPORT.COMMENTS IMP SPEC: NORMAL
PATH REPORT.COMMENTS IMP SPEC: NORMAL
PATH REPORT.RELEVANT HX SPEC: NORMAL

## 2024-04-24 LAB
HUMAN PAPILLOMA VIRUS 16 DNA: NEGATIVE
HUMAN PAPILLOMA VIRUS 18 DNA: NEGATIVE
HUMAN PAPILLOMA VIRUS FINAL DIAGNOSIS: NORMAL
HUMAN PAPILLOMA VIRUS OTHER HR: NEGATIVE

## 2024-06-06 ENCOUNTER — MYC REFILL (OUTPATIENT)
Dept: OBGYN | Facility: CLINIC | Age: 34
End: 2024-06-06
Payer: COMMERCIAL

## 2024-06-06 ENCOUNTER — MYC MEDICAL ADVICE (OUTPATIENT)
Dept: OBGYN | Facility: CLINIC | Age: 34
End: 2024-06-06
Payer: COMMERCIAL

## 2024-06-06 DIAGNOSIS — J45.20 MILD INTERMITTENT ASTHMA WITHOUT COMPLICATION: ICD-10-CM

## 2024-06-07 RX ORDER — LEVALBUTEROL TARTRATE 45 UG/1
1-2 AEROSOL, METERED ORAL EVERY 6 HOURS PRN
Qty: 15 G | Refills: 1 | Status: SHIPPED | OUTPATIENT
Start: 2024-06-07

## 2024-06-12 NOTE — PROGRESS NOTES
"Progress Note    SUBJECTIVE:   Yanni Mckenna is a 34 year old female who reports intermittent vaginal itching for 1 week that presented after her most recent menstrual cycle. She reports no new discharge. Mild change in odor. Reports new dyspareunia associated with friction.  Denies abnormal vaginal bleeding and pelvic pain. No fever or UTI symptoms. Denies history of known exposure to STD, no new sexual partners since last exam in April. She reports having cyclical vaginal itching since 2022.     Contraception: Paragard IUD.      - Recent vaginitis hx:  9/2022 BV treated  2023: Yeast treated via telehealth without testing   4/2024: BV + yeast treated     - Upcoming travel:  Yanni requests a prescription for Ambien for an upcoming trip to [place]. She previously has taken Ambien with good efficacy and no side effects. She is concerned about sleeping on a long flight and adjusting to the new time zone.     Patient's last menstrual period was 06/02/2024 (exact date).    Past Medical History:   Diagnosis Date    Asthma      Past Surgical History:   Procedure Laterality Date    NO HISTORY OF SURGERY        Allergies   Allergen Reactions    Amoxicillin Hives     Current Outpatient Medications   Medication Sig Dispense Refill    levalbuterol (XOPENEX HFA) 45 MCG/ACT inhaler Inhale 1-2 puffs into the lungs every 6 hours as needed for shortness of breath or wheezing 15 g 1    paragard intrauterine copper device 1 each by Intrauterine route once      traZODone (DESYREL) 50 MG tablet Take 1 tablet (50 mg) by mouth at bedtime 90 tablet 3    fluconazole (DIFLUCAN) 150 MG tablet Take 1 tablet by mouth now and then take a 2nd dose after completing the Metrogel. (Patient not taking: Reported on 6/13/2024) 2 tablet 0     No current facility-administered medications for this visit.       OBJECTIVE:   /77   Pulse 51   Ht 1.702 m (5' 7\")   Wt 64.1 kg (141 lb 4.8 oz)   LMP 06/02/2024 (Exact Date)   BMI 22.13 kg/m    General: " pleasant female in no acute distress  Psych: normal mentation, well oriented  Respiratory:  Unlabored breathing  Musculoskeletal: no gross deformities     ASSESSMENT:     Vaginal itching  Adjustment insomnia    PLAN:   - Multiplex swab self-collected today.    - Will treat based on results of self-swab vaginitis panel collected today.    - Return to clinic if symptoms do not resolve or worsen.   - Discussed that after 3 cases of BV in 12 months could consider suppression therapy   - Discussed using OTC boric acid suppositories for cyclical itching. Safety education provided regarding no oral ingestion of boric acid.    - We also discussed that vaginitis is a potential side effect of the ParaGard IUD.   - Rx placed for Ambien to take to help with sleep while traveling. Counseled not to take this with Trazodone.     Pt left, stable. She expressed understanding and agreement with the plan for care.    I, Nathaly Devlin DNP student, completed the PFSH and ROS. I then acted as a scribe for MELODIE Padilla, for the remainder of the visit.    I agree with the PFSH and ROS as completed by the MELODIE Student, except for changes made by me.  The remainder of the encounter was performed by me and scribed by the MELODIE Student.  The scribed note accurately reflects my personal services and decisions made by me.  Susana Rojas DNP, [facility], MELODIE

## 2024-06-13 ENCOUNTER — OFFICE VISIT (OUTPATIENT)
Dept: OBGYN | Facility: CLINIC | Age: 34
End: 2024-06-13
Attending: NURSE PRACTITIONER
Payer: COMMERCIAL

## 2024-06-13 VITALS
WEIGHT: 141.3 LBS | HEIGHT: 67 IN | HEART RATE: 51 BPM | DIASTOLIC BLOOD PRESSURE: 77 MMHG | BODY MASS INDEX: 22.18 KG/M2 | SYSTOLIC BLOOD PRESSURE: 120 MMHG

## 2024-06-13 DIAGNOSIS — F51.02 ADJUSTMENT INSOMNIA: ICD-10-CM

## 2024-06-13 DIAGNOSIS — N89.8 VAGINAL ITCHING: Primary | ICD-10-CM

## 2024-06-13 LAB
BACTERIAL VAGINOSIS VAG-IMP: NEGATIVE
CANDIDA DNA VAG QL NAA+PROBE: NOT DETECTED
CANDIDA GLABRATA / CANDIDA KRUSEI DNA: NOT DETECTED
T VAGINALIS DNA VAG QL NAA+PROBE: NOT DETECTED

## 2024-06-13 PROCEDURE — 99213 OFFICE O/P EST LOW 20 MIN: CPT | Performed by: NURSE PRACTITIONER

## 2024-06-13 PROCEDURE — 0352U MULTIPLEX VAGINAL PANEL BY PCR: CPT | Performed by: NURSE PRACTITIONER

## 2024-06-13 RX ORDER — ZOLPIDEM TARTRATE 5 MG/1
5 TABLET ORAL
Qty: 9 TABLET | Refills: 0 | Status: SHIPPED | OUTPATIENT
Start: 2024-06-13

## 2024-06-13 NOTE — LETTER
6/13/2024       RE: Yanni Mckenna  [address] [facility] [address]     Dear Colleague,    Thank you for referring your patient, Yanni Mckenna, to the [facility] [place] at [facility]. Please see a copy of my visit note below.    Progress Note    SUBJECTIVE:   Yanni Mckenna is a 34 year old female who reports intermittent vaginal itching for 1 week that presented after her most recent menstrual cycle. She reports no new discharge. Mild change in odor. Reports new dyspareunia associated with friction.  Denies abnormal vaginal bleeding and pelvic pain. No fever or UTI symptoms. Denies history of known exposure to STD, no new sexual partners since last exam in April. She reports having cyclical vaginal itching since 2022.     Contraception: Paragard IUD.      - Recent vaginitis hx:  9/2022 BV treated  2023: Yeast treated via telehealth without testing   4/2024: BV + yeast treated     - Upcoming travel:  Yanni requests a prescription for Ambien for an upcoming trip to [place]. She previously has taken Ambien with good efficacy and no side effects. She is concerned about sleeping on a long flight and adjusting to the new time zone.     Patient's last menstrual period was 06/02/2024 (exact date).    Past Medical History:   Diagnosis Date    Asthma      Past Surgical History:   Procedure Laterality Date    NO HISTORY OF SURGERY        Allergies   Allergen Reactions    Amoxicillin Hives     Current Outpatient Medications   Medication Sig Dispense Refill    levalbuterol (XOPENEX HFA) 45 MCG/ACT inhaler Inhale 1-2 puffs into the lungs every 6 hours as needed for shortness of breath or wheezing 15 g 1    paragard intrauterine copper device 1 each by Intrauterine route once      traZODone (DESYREL) 50 MG tablet Take 1 tablet (50 mg) by mouth at bedtime 90 tablet 3    fluconazole (DIFLUCAN) 150 MG tablet Take 1 tablet by mouth now and then take a 2nd  "dose after completing the Metrogel. (Patient not taking: Reported on 6/13/2024) 2 tablet 0     No current facility-administered medications for this visit.       OBJECTIVE:   /77   Pulse 51   Ht 1.702 m (5' 7\")   Wt 64.1 kg (141 lb 4.8 oz)   LMP 06/02/2024 (Exact Date)   BMI 22.13 kg/m    General: pleasant female in no acute distress  Psych: normal mentation, well oriented  Respiratory:  Unlabored breathing  Musculoskeletal: no gross deformities     ASSESSMENT:     Vaginal itching  Adjustment insomnia    PLAN:   - Multiplex swab self-collected today.    - Will treat based on results of self-swab vaginitis panel collected today.    - Return to clinic if symptoms do not resolve or worsen.   - Discussed that after 3 cases of BV in 12 months could consider suppression therapy   - Discussed using OTC boric acid suppositories for cyclical itching. Safety education provided regarding no oral ingestion of boric acid.    - We also discussed that vaginitis is a potential side effect of the ParaGard IUD.   - Rx placed for Ambien to take to help with sleep while traveling. Counseled not to take this with Trazodone.     Pt left, stable. She expressed understanding and agreement with the plan for care.    I, Nathaly Devlin DNP student, completed the PFSH and ROS. I then acted as a scribe for MELODIE Padilla, for the remainder of the visit.    I agree with the PFSH and ROS as completed by the MELODIE Student, except for changes made by me.  The remainder of the encounter was performed by me and scribed by the MELODIE Student.  The scribed note accurately reflects my personal services and decisions made by me.  Susana Rojas DNP, APRN, MELODIE     "

## 2024-06-13 NOTE — PATIENT INSTRUCTIONS
Thank you for trusting us with your care!     If you need to contact us for questions about:  Symptoms, Scheduling & Medical Questions; Non-urgent (2-3 day response) Yari message, Urgent (needing response today) [phone] (if after 3:30pm next day response)   Prescriptions: Please call your Pharmacy   Billing: [facility] [phone] or [facility]:[phone]

## 2024-06-14 DIAGNOSIS — Z87.42 HISTORY OF VAGINITIS: Primary | ICD-10-CM

## 2024-06-14 RX ORDER — FLUCONAZOLE 150 MG/1
150 TABLET ORAL ONCE
Qty: 1 TABLET | Refills: 0 | Status: SHIPPED | OUTPATIENT
Start: 2024-06-14 | End: 2024-06-14

## 2024-06-14 RX ORDER — METRONIDAZOLE 500 MG/1
500 TABLET ORAL 2 TIMES DAILY
Qty: 14 TABLET | Refills: 0 | Status: SHIPPED | OUTPATIENT
Start: 2024-06-14 | End: 2024-06-21

## 2025-01-22 ENCOUNTER — MYC REFILL (OUTPATIENT)
Dept: OBGYN | Facility: CLINIC | Age: 35
End: 2025-01-22
Payer: COMMERCIAL

## 2025-01-22 DIAGNOSIS — F41.9 ANXIETY AND DEPRESSION: ICD-10-CM

## 2025-01-22 DIAGNOSIS — G47.00 INSOMNIA, UNSPECIFIED TYPE: ICD-10-CM

## 2025-01-22 DIAGNOSIS — F32.A ANXIETY AND DEPRESSION: ICD-10-CM

## 2025-01-23 RX ORDER — TRAZODONE HYDROCHLORIDE 50 MG/1
50 TABLET, FILM COATED ORAL AT BEDTIME
Qty: 90 TABLET | Refills: 1 | Status: SHIPPED | OUTPATIENT
Start: 2025-01-23

## 2025-01-23 NOTE — TELEPHONE ENCOUNTER
"Received refill request for trazodone. Pt last seen in clinic 6/13/24.  .   \"8. Anxiety and depression  - traZODone (DESYREL) 50 MG tablet; Take 1 tablet (50 mg) by mouth at bedtime  Dispense: 90 tablet; Refill: 3  Pt feels she is able to function with her current symptoms and is self-aware of how she is feeling and proactive in taking measures to improve symptoms. Counseled pt on option for selective serotonin reuptake inhibitor/ SNRI or hormonal management with a contraceptive option to keep hormones stable throughout the month, as her symptoms seem to worsen in the luteal phase. She is not interested in pharmacologic intervention at this time; desires an integrative approach and to look into hormonal management in other ways.      Additional teaching done at this visit regarding self breast awareness.     Return to clinic in one year.  Follow-up as needed.\"    Will send in enough to get her to end of April and will send a Mychart asking her to schedule something in the spring of 2025.   "

## 2025-02-21 ENCOUNTER — TELEPHONE (OUTPATIENT)
Dept: OBGYN | Facility: CLINIC | Age: 35
End: 2025-02-21
Payer: COMMERCIAL

## 2025-02-21 NOTE — TELEPHONE ENCOUNTER
Referral to GI created, will pend to CHIOMA Padilla. Patient requesting GI referral due to gastroenterology for abdominal pains/cramping and a potential ulcer.       Disorder of bilirubin excretion   Patient diagnosis in chart - noted in 2012.

## 2025-02-21 NOTE — TELEPHONE ENCOUNTER
Recommend patient be seen by primary care (such as Dr. Henson) before a referral to GI is needed. She will be able to be seen much sooner.

## 2025-02-21 NOTE — TELEPHONE ENCOUNTER
Health Call Center    Phone Message    May a detailed message be left on voicemail: yes     Reason for Call: Other: Pt is requesting a referral to gastroenterology for abdominal pains/cramping and a potential ulcer. Pt tried to schedule with them but a referral is required. Pt does not have a PCP provider and was requesting a referral from CHIOMA Rojas. Pt is in a lot of pain and is requesting this be done ASAP. Please call pt with any questions or concerns      Action Taken: Other: OBGYN    Travel Screening: Not Applicable     Date of Service:

## 2025-02-27 ENCOUNTER — MYC MEDICAL ADVICE (OUTPATIENT)
Dept: OBGYN | Facility: CLINIC | Age: 35
End: 2025-02-27
Payer: COMMERCIAL

## 2025-02-27 DIAGNOSIS — K21.00 GASTROESOPHAGEAL REFLUX DISEASE WITH ESOPHAGITIS, UNSPECIFIED WHETHER HEMORRHAGE: Primary | ICD-10-CM

## 2025-02-28 NOTE — TELEPHONE ENCOUNTER
Patient requesting refill of omeprazole.     She was requesting GI referral from Susana Rojas who referred her to PCP for this. Last seen by Susana on 6/13/2024.    Patient scheduled soonest with Dr. Henson on 4/11. Desires refill of omeprazole to get her through to this appointment.     Routed to provider.

## 2025-03-06 RX ORDER — OMEPRAZOLE 40 MG/1
40 CAPSULE, DELAYED RELEASE ORAL DAILY
Qty: 30 CAPSULE | Refills: 0 | Status: SHIPPED | OUTPATIENT
Start: 2025-03-06

## 2025-04-11 ENCOUNTER — LAB (OUTPATIENT)
Dept: LAB | Facility: CLINIC | Age: 35
End: 2025-04-11
Attending: FAMILY MEDICINE
Payer: COMMERCIAL

## 2025-04-11 DIAGNOSIS — R10.13 ABDOMINAL PAIN, EPIGASTRIC: ICD-10-CM

## 2025-04-11 DIAGNOSIS — R19.4 CHANGE IN BOWEL HABIT: ICD-10-CM

## 2025-04-11 PROBLEM — S93.492A SPRAIN OF POSTERIOR TALOFIBULAR LIGAMENT OF LEFT ANKLE, INITIAL ENCOUNTER: Status: RESOLVED | Noted: 2023-11-08 | Resolved: 2025-04-11

## 2025-04-11 PROBLEM — S93.432A SPRAIN OF TIBIOFIBULAR LIGAMENT OF LEFT ANKLE, INITIAL ENCOUNTER: Status: RESOLVED | Noted: 2023-11-08 | Resolved: 2025-04-11

## 2025-04-11 PROBLEM — M76.72 PERONEAL TENDINITIS OF LEFT LOWER EXTREMITY: Status: RESOLVED | Noted: 2023-11-08 | Resolved: 2025-04-11

## 2025-04-11 LAB
ALBUMIN SERPL BCG-MCNC: 4.7 G/DL (ref 3.5–5.2)
ALP SERPL-CCNC: 53 U/L (ref 40–150)
ALT SERPL W P-5'-P-CCNC: 22 U/L (ref 0–50)
ANION GAP SERPL CALCULATED.3IONS-SCNC: 9 MMOL/L (ref 7–15)
AST SERPL W P-5'-P-CCNC: 21 U/L (ref 0–45)
BASOPHILS # BLD AUTO: 0 10E3/UL (ref 0–0.2)
BASOPHILS NFR BLD AUTO: 1 %
BILIRUB SERPL-MCNC: 1.2 MG/DL
BUN SERPL-MCNC: 11.4 MG/DL (ref 6–20)
CALCIUM SERPL-MCNC: 9.4 MG/DL (ref 8.8–10.4)
CHLORIDE SERPL-SCNC: 102 MMOL/L (ref 98–107)
CREAT SERPL-MCNC: 0.9 MG/DL (ref 0.51–0.95)
CRP SERPL-MCNC: <3 MG/L
EGFRCR SERPLBLD CKD-EPI 2021: 85 ML/MIN/1.73M2
EOSINOPHIL # BLD AUTO: 0.1 10E3/UL (ref 0–0.7)
EOSINOPHIL NFR BLD AUTO: 1 %
ERYTHROCYTE [DISTWIDTH] IN BLOOD BY AUTOMATED COUNT: 11.2 % (ref 10–15)
GLUCOSE SERPL-MCNC: 89 MG/DL (ref 70–99)
HCO3 SERPL-SCNC: 26 MMOL/L (ref 22–29)
HCT VFR BLD AUTO: 41.7 % (ref 35–47)
HGB BLD-MCNC: 14.4 G/DL (ref 11.7–15.7)
IMM GRANULOCYTES # BLD: 0 10E3/UL
IMM GRANULOCYTES NFR BLD: 0 %
LYMPHOCYTES # BLD AUTO: 1.5 10E3/UL (ref 0.8–5.3)
LYMPHOCYTES NFR BLD AUTO: 36 %
MCH RBC QN AUTO: 31.2 PG (ref 26.5–33)
MCHC RBC AUTO-ENTMCNC: 34.5 G/DL (ref 31.5–36.5)
MCV RBC AUTO: 91 FL (ref 78–100)
MONOCYTES # BLD AUTO: 0.3 10E3/UL (ref 0–1.3)
MONOCYTES NFR BLD AUTO: 8 %
NEUTROPHILS # BLD AUTO: 2.2 10E3/UL (ref 1.6–8.3)
NEUTROPHILS NFR BLD AUTO: 53 %
NRBC # BLD AUTO: 0 10E3/UL
NRBC BLD AUTO-RTO: 0 /100
PLATELET # BLD AUTO: 257 10E3/UL (ref 150–450)
POTASSIUM SERPL-SCNC: 4 MMOL/L (ref 3.4–5.3)
PROT SERPL-MCNC: 7.2 G/DL (ref 6.4–8.3)
RBC # BLD AUTO: 4.61 10E6/UL (ref 3.8–5.2)
SODIUM SERPL-SCNC: 137 MMOL/L (ref 135–145)
TSH SERPL DL<=0.005 MIU/L-ACNC: 1.56 UIU/ML (ref 0.3–4.2)
WBC # BLD AUTO: 4.2 10E3/UL (ref 4–11)

## 2025-04-11 PROCEDURE — 84155 ASSAY OF PROTEIN SERUM: CPT

## 2025-04-11 PROCEDURE — 85004 AUTOMATED DIFF WBC COUNT: CPT

## 2025-04-11 PROCEDURE — 86364 TISS TRNSGLTMNASE EA IG CLAS: CPT

## 2025-04-11 PROCEDURE — 85041 AUTOMATED RBC COUNT: CPT

## 2025-04-11 PROCEDURE — 86140 C-REACTIVE PROTEIN: CPT

## 2025-04-11 PROCEDURE — 82784 ASSAY IGA/IGD/IGG/IGM EACH: CPT

## 2025-04-11 PROCEDURE — 82565 ASSAY OF CREATININE: CPT

## 2025-04-11 PROCEDURE — 36415 COLL VENOUS BLD VENIPUNCTURE: CPT

## 2025-04-11 PROCEDURE — 84443 ASSAY THYROID STIM HORMONE: CPT

## 2025-04-14 LAB
IGA SERPL-MCNC: 214 MG/DL (ref 84–499)
TTG IGA SER-ACNC: 0.8 U/ML
TTG IGG SER-ACNC: <0.6 U/ML

## 2025-04-24 ENCOUNTER — OFFICE VISIT (OUTPATIENT)
Dept: OBGYN | Facility: CLINIC | Age: 35
End: 2025-04-24
Attending: NURSE PRACTITIONER
Payer: COMMERCIAL

## 2025-04-24 VITALS
DIASTOLIC BLOOD PRESSURE: 79 MMHG | HEART RATE: 77 BPM | SYSTOLIC BLOOD PRESSURE: 134 MMHG | HEIGHT: 67 IN | BODY MASS INDEX: 23.06 KG/M2 | WEIGHT: 146.9 LBS

## 2025-04-24 DIAGNOSIS — N89.8 VAGINAL ITCHING: ICD-10-CM

## 2025-04-24 DIAGNOSIS — Z00.00 VISIT FOR PREVENTIVE HEALTH EXAMINATION: Primary | ICD-10-CM

## 2025-04-24 DIAGNOSIS — F32.A ANXIETY AND DEPRESSION: ICD-10-CM

## 2025-04-24 DIAGNOSIS — F41.9 ANXIETY AND DEPRESSION: ICD-10-CM

## 2025-04-24 DIAGNOSIS — G47.00 INSOMNIA, UNSPECIFIED TYPE: ICD-10-CM

## 2025-04-24 DIAGNOSIS — J45.20 MILD INTERMITTENT ASTHMA WITHOUT COMPLICATION: ICD-10-CM

## 2025-04-24 DIAGNOSIS — Z11.3 SCREEN FOR STD (SEXUALLY TRANSMITTED DISEASE): ICD-10-CM

## 2025-04-24 DIAGNOSIS — N89.8 VAGINAL ODOR: ICD-10-CM

## 2025-04-24 LAB
BACTERIAL VAGINOSIS VAG-IMP: POSITIVE
CANDIDA DNA VAG QL NAA+PROBE: DETECTED
CANDIDA GLABRATA / CANDIDA KRUSEI DNA: NOT DETECTED
T VAGINALIS DNA VAG QL NAA+PROBE: NOT DETECTED

## 2025-04-24 PROCEDURE — 81515 NFCT DS BV&VAGINITIS DNA ALG: CPT | Performed by: NURSE PRACTITIONER

## 2025-04-24 PROCEDURE — 99213 OFFICE O/P EST LOW 20 MIN: CPT | Performed by: NURSE PRACTITIONER

## 2025-04-24 PROCEDURE — 87491 CHLMYD TRACH DNA AMP PROBE: CPT | Performed by: NURSE PRACTITIONER

## 2025-04-24 RX ORDER — LEVALBUTEROL TARTRATE 45 UG/1
1-2 AEROSOL, METERED ORAL EVERY 6 HOURS PRN
Qty: 15 G | Refills: 0 | Status: SHIPPED | OUTPATIENT
Start: 2025-04-24

## 2025-04-24 RX ORDER — TRAZODONE HYDROCHLORIDE 50 MG/1
50 TABLET ORAL AT BEDTIME
Qty: 90 TABLET | Refills: 3 | Status: SHIPPED | OUTPATIENT
Start: 2025-04-24

## 2025-04-24 ASSESSMENT — ANXIETY QUESTIONNAIRES
2. NOT BEING ABLE TO STOP OR CONTROL WORRYING: SEVERAL DAYS
1. FEELING NERVOUS, ANXIOUS, OR ON EDGE: SEVERAL DAYS
5. BEING SO RESTLESS THAT IT IS HARD TO SIT STILL: SEVERAL DAYS
GAD7 TOTAL SCORE: 6
GAD7 TOTAL SCORE: 6
6. BECOMING EASILY ANNOYED OR IRRITABLE: SEVERAL DAYS
8. IF YOU CHECKED OFF ANY PROBLEMS, HOW DIFFICULT HAVE THESE MADE IT FOR YOU TO DO YOUR WORK, TAKE CARE OF THINGS AT HOME, OR GET ALONG WITH OTHER PEOPLE?: SOMEWHAT DIFFICULT
4. TROUBLE RELAXING: SEVERAL DAYS
7. FEELING AFRAID AS IF SOMETHING AWFUL MIGHT HAPPEN: NOT AT ALL
7. FEELING AFRAID AS IF SOMETHING AWFUL MIGHT HAPPEN: NOT AT ALL
3. WORRYING TOO MUCH ABOUT DIFFERENT THINGS: SEVERAL DAYS
IF YOU CHECKED OFF ANY PROBLEMS ON THIS QUESTIONNAIRE, HOW DIFFICULT HAVE THESE PROBLEMS MADE IT FOR YOU TO DO YOUR WORK, TAKE CARE OF THINGS AT HOME, OR GET ALONG WITH OTHER PEOPLE: SOMEWHAT DIFFICULT
GAD7 TOTAL SCORE: 6

## 2025-04-24 NOTE — PROGRESS NOTES
Progress Note    SUBJECTIVE:  Yanni Mckenna is an 35 year old, , who requests an Annual Preventive Exam.     Concerns today include:     Recurrent yeast infections: Currently having increased itching. Period started yesterday and is noticing a different odor than normally accompanies period. Has been treated for both yeast and BV over the last couple of years. Was last treated for a yeast infection in 2024 with Diflucan. Exercises a lot but always changes out of workout clothes immediately upon finishing workout. Wears cotton underwear during the day and does not sleep with underwear at night. Eats yogurt regularly. Does have questions regarding new research available for partner treatment for BV. Is wanting to self collect today for BV/yeast.      2.  Wants refill on albuterol. Using it a couple times per week, feels it gets worse this time of year due to seasonal allergies.     Recently saw Dr. Henson for GI symptoms; heart burn has significantly improved with omeprazole that she took for a short period of time. She was referred to GI and plans to complete this appointment.       Sexual hx:  - Contraception: Paraguard IUD placed 10/2021 - likes this method as it's nonhormonal   - Sexually active with 1 partner  - No STD concerns but open to STD testing today.    Last Pap smear 2024: NIL, HPV neg --> due   - hx of abnormal pap smear in      Menstrual hx:   - LMP: 25  - Gets regular monthly periods with paraguard every 28 days, bleeds for about 4 days.      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 She moved here in 2017; has her own house, lives alone with a poodle [place] dog, and feels safe at home.     Exercise: Plays ultimate frisbee in the summer. Starting to train for frisbee season right now. Tries to run at least once a week and daily 20-30 min walks with her dog.       Diet: Feels she gets a pretty good variety of foods. She prioritizes eating more vegetables and lean protein; chicken and turkey. Usually eats fruits every day, eats a banana every morning with nut butter.     Mental health:  - Has anxiety and depression episodes and intense volatile moods. She attends therapy regularly that sometimes is weekly and sometimes is monthly. Really likes her therapist. Symptoms seem to worsen leading up to her menses. She currently is using lifestyle management as an intervention to help with her anxiety and moods. She has trouble staying asleep but not falling asleep; takes trazodone at night and it's been working well for her sleep. Requests refill today.     Recent Labs   Lab Test 24  1039 10/29/19  0958   CHOL 150 138   HDL 63 63   LDL 62 55   TRIG 123 98        Menstrual History:      2024     9:20 AM 2024    10:00 AM 2025    10:40 AM   Menstrual History   LAST MENSTRUAL PERIOD 2024 2024 3/27/2025     Mammogram current: not applicable  Last Colonoscopy: not applicable    HISTORY:  Prescription Medications as of 2025         Rx Number Disp Refills Start End Last Dispensed Date Next Fill Date Owning Pharmacy    levalbuterol (XOPENEX HFA) 45 MCG/ACT inhaler  15 g 0 2025 --   [facility]/pharmacy #[id number] - [place], [place] - [address]    Sig: Inhale 1-2 puffs into the lungs every 6 hours as needed for shortness of breath or wheezing.    Class: E-Prescribe    Route: Inhalation    omeprazole (PRILOSEC) 40 MG DR capsule  30 capsule 0 3/6/2025 --   [facility]/pharmacy #[id number] - [place], [place] - [address]    Sig: Take 1 capsule (40 mg) by mouth daily.    Class: E-Prescribe    Route: Oral    paragard intrauterine copper device  -- --  --   [facility] [id number] [facility] - [place], [place] -  [facility]    Si each by Intrauterine route once    Class: Historical    Route: Intrauterine    traZODone (DESYREL) 50 MG tablet   90 tablet 3 2025 --   [facility]/pharmacy #[id number] - [place], [place] - [address]    Sig: Take 1 tablet (50 mg) by mouth at bedtime.    Class: E-Prescribe    Route: Oral    zolpidem (AMBIEN) 5 MG tablet  9 tablet 0 2024 --   [facility]/pharmacy #[id number] - [place], [place] - [address]    Sig: Take 1 tablet (5 mg) by mouth nightly as needed for sleep    Class: E-Prescribe    Route: Oral          Allergies   Allergen Reactions    Amoxicillin Hives     Immunization History   Administered Date(s) Administered    COVID-19 MONOVALENT 12+ (Pfizer) 2021, 2021    HIB(PRP-OMP)(PedvaxHIB) 1990    HepB, Unspecified 10/29/1997, 1997, 1998    Hepatitis A (VAQTA)(ADULT 19+) 2007, 2018    Historical DTP/aP 1989, 1989, 1989, 1990, 1994    Hpv, Unspecified  2007, 2007, 2008    Influenza (IIV3) PF 10/09/1994, 1994, 1994    Influenza Vaccine >6 months,quad, PF 2018, 10/29/2019, 2020, 10/26/2021, 2022    MMR (MMRII) 1990, 1994    Meningococcal Mcv4 Conjugate,unspecified  2007    Meningococcal,unspecified 2007    OPV, trivalent, live 1989, 1989, 1990, 1994    Pneumococcal 23 valent 04/15/2016    TD,PF 7+ (Tenivac) 1999    TDAP (Adacel,Boostrix) 2007, 2018    Typhoid IM 2018    Typhoid Oral 2018       OB History    Para Term  AB Living   0 0 0 0 0 0   SAB IAB Ectopic Multiple Live Births   0 0 0 0 0     Past Medical History:   Diagnosis Date    Abnormal Pap smear of cervix     Asthma     History of abnormal cervical Pap smear 04/15/2016    Overview:   In       History of human papillomavirus infection 2007    Insomnia 04/15/2016    Peroneal tendinitis of left lower extremity 2023    Sprain of posterior talofibular ligament of left ankle, initial encounter 2023    Sprain of  tibiofibular ligament of left ankle, initial encounter 11/08/2023    Urinary tract infection      Past Surgical History:   Procedure Laterality Date    NO HISTORY OF SURGERY       Family History   Problem Relation Age of Onset    GERD Sister      Social History     Socioeconomic History    Marital status: Single   Occupational History    Occupation:    Tobacco Use    Smoking status: Never    Smokeless tobacco: Never   Substance and Sexual Activity    Alcohol use: Yes     Alcohol/week: 5.0 standard drinks of alcohol     Types: 5 Standard drinks or equivalent per week     Comment: 0-5 drinks per month    Drug use: Yes     Types: Marijuana     Comment: daily    Sexual activity: Yes     Partners: Male     Birth control/protection: I.U.D.   Social History Narrative    How much exercise per week? Active daily, yoga 4-5 x's    How much calcium per day? In foods       How much caffeine per day? 1 cup coffee    How much vitamin D per day? Multivitamin    Do you/your family wear seatbelts?  Yes    Do you/your family use safety helmets? No    Do you/your family use sunscreen? Yes    Do you/your family keep firearms in the home? No    Do you/your family have a smoke detector(s)? Yes        November 5, 2020 Fco Jimenez LPN        How much exercise per week? 6 times per week    How much calcium per day? Through foods       How much caffeine per day? 2 cups coffee    How much vitamin D per day? Not much    Do you/your family wear seatbelts?  Yes    Do you/your family use safety helmets? Yes    Do you/your family use sunscreen? Yes    Do you/your family keep firearms in the home? No    Do you/your family have a smoke detector(s)? Yes        Reviewed by Jessica Leon 10-26-21                ROS: 10 point ROS neg other than the symptoms noted above in the HPI.       2/26/2019    10:40 AM 10/29/2019     9:56 AM 11/5/2020    10:29 AM   PHQ-9 SCORE   PHQ-9 Total Score 13 4 7     Answers submitted by the patient for this  "visit:  Patient Health Questionnaire (G7) (Submitted on 4/24/2025)  TIMOTHY 7 TOTAL SCORE: 6    EXAM:  Blood pressure 134/79, pulse 77, height 1.702 m (5' 7\"), weight 66.6 kg (146 lb 14.4 oz), last menstrual period 04/23/2025, not currently breastfeeding. Body mass index is 23.01 kg/m .  General - pleasant female in no acute distress.  Skin - no suspicious lesions or rashes  Neck - supple without lymphadenopathy.  Lungs - clear to auscultation bilaterally.  Heart - regular rate and rhythm without murmur.  Abdomen - soft, nontender, nondistended, no masses or organomegaly noted.  Musculoskeletal - no gross deformities.  Neurological - normal strength, sensation, and mental status.    Breast Exam:  Breast: Without visible skin changes. No dimpling or lesions seen.   Breasts supple, non-tender with palpation, no dominant mass, nodularity, or nipple discharge noted bilaterally. Axillary nodes negative.      Pelvic Exam:  Deferred, denies concerns today    ASSESSMENT/PLAN:  1. Visit for preventive health examination (Primary)  - Gonorrhea PCR  - Chlamydia trachomatis PCR  - Pt self-collected vaginal multiplex.   - Pap smear due 2029    2. Vaginal odor  3. Vaginal itching  - Multiplex Vaginal Panel by PCR  Counseled pt on definition of recurrent BV and yeast by clinical definition. May consider suppressive therapy if meeting this diagnosis. Discussed with patient the study that found treating male partners of persons who have recurrent BV to be helpful.    4. Mild intermittent asthma without complication  - levalbuterol (XOPENEX HFA) 45 MCG/ACT inhaler; Inhale 1-2 puffs into the lungs every 6 hours as needed for shortness of breath or wheezing.  Dispense: 15 g; Refill: 0  - Recommended pt to be seen by Dr. Felix due to new DARY asthma guidelines; encouraged patient to discuss best med management plan. Pt agreeable.    5. Screen for STD (sexually transmitted disease)  - Gonorrhea PCR  - Chlamydia trachomatis PCR    6. " Insomnia, unspecified type  7. Anxiety and depression  - traZODone (DESYREL) 50 MG tablet; Take 1 tablet (50 mg) by mouth at bedtime.  Dispense: 90 tablet; Refill: 3     Additional teaching done at this visit regarding self breast awareness.    Return to clinic in one year.  Follow-up as needed.    I, Sammi Alanis, completed the PFSH and ROS. I then acted as a scribe for MELODIE Padilla, for the remainder of the visit.  MELODIE Armendariz, BELLE Student     I agree with the PFSH and ROS as completed by the MELODIE Student, except for changes made by me.  The remainder of the encounter was performed by me and scribed by the MELODIE Student.  The scribed note accurately reflects my personal services and decisions made by me.  Susana Rojas DNP, CHIOMA, MELODIE

## 2025-04-24 NOTE — LETTER
2025       RE: Yanni Mckenna  [address] [facility] [address]     Dear Colleague,    Thank you for referring your patient, Yanni Mckenna, to the [facility] [place] at [facility]. Please see a copy of my visit note below.    Progress Note    SUBJECTIVE:  Yanni Mckenna is an 35 year old, , who requests an Annual Preventive Exam.     Concerns today include:     Recurrent yeast infections: Currently having increased itching. Period started yesterday and is noticing a different odor than normally accompanies period. Has been treated for both yeast and BV over the last couple of years. Was last treated for a yeast infection in 2024 with Diflucan. Exercises a lot but always changes out of workout clothes immediately upon finishing workout. Wears cotton underwear during the day and does not sleep with underwear at night. Eats yogurt regularly. Does have questions regarding new research available for partner treatment for BV. Is wanting to self collect today for BV/yeast.      2.  Wants refill on albuterol. Using it a couple times per week, feels it gets worse this time of year due to seasonal allergies.     Recently saw Dr. Henson for GI symptoms; heart burn has significantly improved with omeprazole that she took for a short period of time. She was referred to GI and plans to complete this appointment.       Sexual hx:  - Contraception: Paraguard IUD placed 10/2021 - likes this method as it's nonhormonal   - Sexually active with 1 partner  - No STD concerns but open to STD testing today.    Last Pap smear 2024: NIL, HPV neg --> due   - hx of abnormal pap smear in      Menstrual hx:   - LMP: 25  - Gets regular monthly periods with paraguard every 28 days, bleeds for about 4 days.      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 She moved here in 2017; has her own house, lives alone with a poodle [place] dog, and feels safe at home.     Exercise: Plays ultimate [employer] in the summer. Starting to train for [employer] season right now. Tries to run at least once a week and daily 20-30 min walks with her dog.      Diet: Feels she gets a pretty good variety of foods. She prioritizes eating more vegetables and lean protein; chicken and turkey. Usually eats fruits every day, eats a banana every morning with nut butter.     Mental health:  - Has anxiety and depression episodes and intense volatile moods. She attends therapy regularly that sometimes is weekly and sometimes is monthly. Really likes her therapist. Symptoms seem to worsen leading up to her menses. She currently is using lifestyle management as an intervention to help with her anxiety and moods. She has trouble staying asleep but not falling asleep; takes trazodone at night and it's been working well for her sleep. Requests refill today.     Recent Labs   Lab Test 04/18/24  1039 10/29/19  0958   CHOL 150 138   HDL 63 63   LDL 62 55   TRIG 123 98        Menstrual History:      4/18/2024     9:20 AM 6/13/2024    10:00 AM 4/11/2025    10:40 AM   Menstrual History   LAST MENSTRUAL PERIOD 4/6/2024 6/2/2024 3/27/2025     Mammogram current: not applicable  Last Colonoscopy: not applicable    HISTORY:  Prescription Medications as of 4/24/2025         Rx Number Disp Refills Start End Last Dispensed Date Next Fill Date Owning Pharmacy    levalbuterol (XOPENEX HFA) 45 MCG/ACT inhaler  15 g 0 4/24/2025 --   [facility]/pharmacy #[id number] [facility] [address]    Sig: Inhale 1-2 puffs into the lungs every 6 hours as needed for shortness of breath or wheezing.    Class: E-Prescribe    Route: Inhalation    omeprazole (PRILOSEC) 40 MG DR capsule  30 capsule 0 3/6/2025 --   [facility]/pharmacy #[id number] [place], [place] - [address]    Sig: Take 1  capsule (40 mg) by mouth daily.    Class: E-Prescribe    Route: Oral    paragard intrauterine copper device  -- --  --   [id number] - [place], [place] - [address] [facility]    Si each by Intrauterine route once    Class: Historical    Route: Intrauterine    traZODone (DESYREL) 50 MG tablet  90 tablet 3 2025 --   [id number] - [place], [place] - [address]    Sig: Take 1 tablet (50 mg) by mouth at bedtime.    Class: E-Prescribe    Route: Oral    zolpidem (AMBIEN) 5 MG tablet  9 tablet 0 2024 --   [id number] - [place], [place] - [address]    Sig: Take 1 tablet (5 mg) by mouth nightly as needed for sleep    Class: E-Prescribe    Route: Oral          Allergies   Allergen Reactions     Amoxicillin Hives     Immunization History   Administered Date(s) Administered     COVID-19 MONOVALENT 12+ (Pfizer) 2021, 2021     HIB(PRP-OMP)(PedvaxHIB) 1990     HepB, Unspecified 10/29/1997, 1997, 1998     Hepatitis A (VAQTA)(ADULT 19+) 2007, 2018     Historical DTP/aP 1989, 1989, 1989, 1990, 1994     Hpv, Unspecified  2007, 2007, 2008     Influenza (IIV3) PF 10/09/1994, 1994, 1994     Influenza Vaccine >6 months,quad, PF 2018, 10/29/2019, 2020, 10/26/2021, 2022     MMR (MMRII) 1990, 1994     Meningococcal Mcv4 Conjugate,unspecified  2007     Meningococcal,unspecified 2007     OPV, trivalent, live 1989, 1989, 1990, 1994     Pneumococcal 23 valent 04/15/2016     TD,PF 7+ (Tenivac) 1999     TDAP (Adacel,Boostrix) 2007, 2018     Typhoid IM 2018     Typhoid Oral 2018       OB History    Para Term  AB Living   0 0 0 0 0 0   SAB IAB Ectopic Multiple Live Births   0 0 0 0 0     Past Medical History:   Diagnosis Date     Abnormal Pap smear of cervix  2007     Asthma      History of abnormal cervical Pap smear 04/15/2016    Overview:   In 2009       History of human papillomavirus infection 2007     Insomnia 04/15/2016     Peroneal tendinitis of left lower extremity 11/08/2023     Sprain of posterior talofibular ligament of left ankle, initial encounter 11/08/2023     Sprain of tibiofibular ligament of left ankle, initial encounter 11/08/2023     Urinary tract infection      Past Surgical History:   Procedure Laterality Date     NO HISTORY OF SURGERY       Family History   Problem Relation Age of Onset     GERD Sister      Social History     Socioeconomic History     Marital status: Single   Occupational History     Occupation:    Tobacco Use     Smoking status: Never     Smokeless tobacco: Never   Substance and Sexual Activity     Alcohol use: Yes     Alcohol/week: 5.0 standard drinks of alcohol     Types: 5 Standard drinks or equivalent per week     Comment: 0-5 drinks per month     Drug use: Yes     Types: Marijuana     Comment: daily     Sexual activity: Yes     Partners: Male     Birth control/protection: I.U.D.   Social History Narrative    How much exercise per week? Active daily, yoga 4-5 x's    How much calcium per day? In foods       How much caffeine per day? 1 cup coffee    How much vitamin D per day? Multivitamin    Do you/your family wear seatbelts?  Yes    Do you/your family use safety helmets? No    Do you/your family use sunscreen? Yes    Do you/your family keep firearms in the home? No    Do you/your family have a smoke detector(s)? Yes        November 5, 2020 Fco Jimenez LPN        How much exercise per week? 6 times per week    How much calcium per day? Through foods       How much caffeine per day? 2 cups coffee    How much vitamin D per day? Not much    Do you/your family wear seatbelts?  Yes    Do you/your family use safety helmets? Yes    Do you/your family use sunscreen? Yes    Do you/your family keep firearms in the home?  "No    Do you/your family have a smoke detector(s)? Yes        Reviewed by Jessica Leon 10-26-21                ROS: 10 point ROS neg other than the symptoms noted above in the HPI.       2/26/2019    10:40 AM 10/29/2019     9:56 AM 11/5/2020    10:29 AM   PHQ-9 SCORE   PHQ-9 Total Score 13 4 7     Answers submitted by the patient for this visit:  Patient Health Questionnaire (G7) (Submitted on 4/24/2025)  TIMOTHY 7 TOTAL SCORE: 6    EXAM:  Blood pressure 134/79, pulse 77, height 1.702 m (5' 7\"), weight 66.6 kg (146 lb 14.4 oz), last menstrual period 04/23/2025, not currently breastfeeding. Body mass index is 23.01 kg/m .  General - pleasant female in no acute distress.  Skin - no suspicious lesions or rashes  Neck - supple without lymphadenopathy.  Lungs - clear to auscultation bilaterally.  Heart - regular rate and rhythm without murmur.  Abdomen - soft, nontender, nondistended, no masses or organomegaly noted.  Musculoskeletal - no gross deformities.  Neurological - normal strength, sensation, and mental status.    Breast Exam:  Breast: Without visible skin changes. No dimpling or lesions seen.   Breasts supple, non-tender with palpation, no dominant mass, nodularity, or nipple discharge noted bilaterally. Axillary nodes negative.      Pelvic Exam:  Deferred, denies concerns today    ASSESSMENT/PLAN:  1. Visit for preventive health examination (Primary)  - Gonorrhea PCR  - Chlamydia trachomatis PCR  - Pt self-collected vaginal multiplex.   - Pap smear due 2029    2. Vaginal odor  3. Vaginal itching  - Multiplex Vaginal Panel by PCR  Counseled pt on definition of recurrent BV and yeast by clinical definition. May consider suppressive therapy if meeting this diagnosis. Discussed with patient the study that found treating male partners of persons who have recurrent BV to be helpful.    4. Mild intermittent asthma without complication  - levalbuterol (XOPENEX HFA) 45 MCG/ACT inhaler; Inhale 1-2 puffs into the lungs " every 6 hours as needed for shortness of breath or wheezing.  Dispense: 15 g; Refill: 0  - Recommended pt to be seen by Dr. Felix due to new DARY asthma guidelines; encouraged patient to discuss best med management plan. Pt agreeable.    5. Screen for STD (sexually transmitted disease)  - Gonorrhea PCR  - Chlamydia trachomatis PCR    6. Insomnia, unspecified type  7. Anxiety and depression  - traZODone (DESYREL) 50 MG tablet; Take 1 tablet (50 mg) by mouth at bedtime.  Dispense: 90 tablet; Refill: 3     Additional teaching done at this visit regarding self breast awareness.    Return to clinic in one year.  Follow-up as needed.    I, Sammi Alanis, completed the PFSH and ROS. I then acted as a scribe for MELODIE Padilla, for the remainder of the visit.  MELODIE Armendariz, BELLE Student     I agree with the PFSH and ROS as completed by the MELODIE Student, except for changes made by me.  The remainder of the encounter was performed by me and scribed by the MELODIE Student.  The scribed note accurately reflects my personal services and decisions made by me.  Susana Rojas DNP, CHIOMA, MELODIE                Again, thank you for allowing me to participate in the care of your patient.      Sincerely,    CHIOMA Esquivel CNP

## 2025-04-24 NOTE — PATIENT INSTRUCTIONS
Thank you for trusting us with your care!   Please be aware, if you are on Mychart, you may see your results prior to your providers review. If labs are abnormal, we will call or message you on [facility] with a follow up plan.    If you need to contact us for questions about:  Symptoms, Scheduling & Medical Questions; Non-urgent (2-3 day response) IDverge message, Urgent (needing response today) [phone] (if after 3:30pm next day response)   Prescriptions: Please call your Pharmacy   Billing: [facility] [phone] or [facility]:[phone]

## 2025-04-25 ENCOUNTER — MYC MEDICAL ADVICE (OUTPATIENT)
Dept: OBGYN | Facility: CLINIC | Age: 35
End: 2025-04-25
Payer: COMMERCIAL

## 2025-04-30 NOTE — TELEPHONE ENCOUNTER
Patient desires to know why Metrogel was ordered versus PO treatment.     She was last seen by Susana Rojas on 4/24/2025 for return annual. Has hx of vaginitis with infection. Per provider:     9/2022: BV treated  2023: Yeast treated via telehealth without testing   4/2024: BV + yeast treated    She was + for vaginal yeast infection and BV. Ordered Diflucan and vaginal Metrogel treatment.     Routed to provider for insight.

## 2025-05-05 ENCOUNTER — VIRTUAL VISIT (OUTPATIENT)
Dept: GASTROENTEROLOGY | Facility: CLINIC | Age: 35
End: 2025-05-05
Attending: FAMILY MEDICINE
Payer: COMMERCIAL

## 2025-05-05 DIAGNOSIS — R19.4 CHANGE IN BOWEL HABIT: ICD-10-CM

## 2025-05-05 DIAGNOSIS — R10.13 ABDOMINAL PAIN, EPIGASTRIC: ICD-10-CM

## 2025-05-05 DIAGNOSIS — R10.9 ABDOMINAL CRAMPING: ICD-10-CM

## 2025-05-05 DIAGNOSIS — R14.0 ABDOMINAL BLOATING: Primary | ICD-10-CM

## 2025-05-05 PROCEDURE — 98002 SYNCH AUDIO-VIDEO NEW MOD 45: CPT | Performed by: PHYSICIAN ASSISTANT

## 2025-05-05 PROCEDURE — 1126F AMNT PAIN NOTED NONE PRSNT: CPT | Mod: 95 | Performed by: PHYSICIAN ASSISTANT

## 2025-05-05 ASSESSMENT — PAIN SCALES - GENERAL: PAINLEVEL_OUTOF10: NO PAIN (0)

## 2025-05-05 NOTE — PROGRESS NOTES
GASTROENTEROLOGY NEW PATIENT VIDEO VISIT    CC/REFERRING MD:    Gladys Henson    REASON FOR CONSULTATION:   Gladys Henson for   Chief Complaint   Patient presents with    Consult       HISTORY OF PRESENT ILLNESS:    Yanni Mckenna is a 35 year old female with a past medical history significant for insomnia who is being evaluated via a billable video visit for a few concerns.  Yanni describes having a multiyear history of recurrent abdominal cramping, bloating, and loose stool occurring around once per week or so.  Initially noticed this following consumption of alcohol, even in smaller amounts, but seems to be occurring with is much frequency without really any alcohol exposure.  Unclear if there are any other specific dietary triggers, does eat a varied diet with lean proteins, grains, fruits, vegetables, does avoid fish.  Has not used any medications to try to treat this.  She did have a spell of some epigastric pain a few months ago that resolved with a 2-week course of omeprazole.  Has not had any recurrence of those symptoms.  No blood or mucus in the stool.  No pertinent surgical history.  Family medical history notable for GERD in sister.  No tobacco, 0-2 alcoholic drinks per month, does endorse marijuana use.  Recent labs with PCP notable for normal CBC, CMP, TSH, CRP, TTG IgA/IgG, and total IgA.  No recent abdominal imaging, has not had endoscopy or colonoscopy before.      I have reviewed and updated the patient's Past Medical History, Social History, Family History and Medication List.    Exam:    General appearance:  Healthy appearing adult, in no acute distress  Eyes:  Sclera anicteric, Pupils round and reactive to light  Ears, nose, mouth and throat:  No obvious external lesions of ears and nose.  Hearing intact  Neck:  Symmetric, No obvious external lesions  Respiratory:  Normal respiration, no use of accessory muscles   MSK:  No visual upper extremity, neck or facial muscle  atrophy  ABD:  No visual abdominal distention, no audible borborygmi  Skin:  No rashes or jaundice   Psychiatric:  Oriented to person, place and time, Appropriate mood and affect.   Neurologic:  Peripheral muscle function and dexterity appear to be intact      PERTINENT STUDIES have been reviewed.    ASSESSMENT/PLAN:    Yanni Mckenna is a 35 year old female who presents for evaluation of a couple of concerns.  Recent brief spell of some epigastric pain, sounds like this was reflux related, improved with brief course of PPI with no recurrence.  I encouraged her to continue monitoring for any recurrence of this.  Regarding her more longstanding lower GI distress, certainly some features of IBS-D here, though not with clear dietary trigger.  Celiac disease ruled out, will also check for bile acid diarrhea, EPI, and IBD with stool testing as detailed below.  If these are normal, will likely plan for a trial of sustained soluble fiber, consideration of low FODMAP diet.     1. Change in bowel habit  - Adult GI  Referral - Consult Only  - Calprotectin Feces; Future  - Elastase Fecal; Future  - [facility]; BA48F; Bile acids, feces, 48 hours (Laboratory Miscellaneous Order); Future    2. Abdominal pain, epigastric  - Adult GI  Referral - Consult Only    3. Abdominal bloating (Primary)  - Calprotectin Feces; Future  - Elastase Fecal; Future  - [facility]; BA48F; Bile acids, feces, 48 hours (Laboratory Miscellaneous Order); Future    4. Abdominal cramping  - Calprotectin Feces; Future  - Elastase Fecal; Future  - [facility]; BA48F; Bile acids, feces, 48 hours (Laboratory Miscellaneous Order); Future        Video-Visit Details    Video Visit Time: 18 minutes    Type of service:  Video Visit    Originating Location (pt. Location): Home    Distant Location (provider location):  Off-site    Platform used for Video Visit: Fazal Wilcox PA-C    RTC 3  months    Thank you for this consultation.  It was a pleasure to participate in the care of this patient; please contact us with any further questions.  A total of 27 minutes was spent with reviewing the chart, discussing with the patient, documentation and coordination of care on 5/5/2025.    This note was created with voice recognition software, and while reviewed for accuracy, typos may remain.     Reagan Wilcox PA-C  Division of Gastroenterology, Hepatology and Nutrition  [facility]  [phone]

## 2025-05-05 NOTE — NURSING NOTE
Current patient location: [address] [place] [id number]    Is the patient currently in the state of [place]? YES    Visit mode: VIDEO    If the visit is dropped, the patient can be reconnected by:VIDEO VISIT: Send to e-mail at: [email or web address]    Will anyone else be joining the visit? NO  (If patient encounters technical issues they should call [phone]    Are changes needed to the allergy or medication list? No    Are refills needed on medications prescribed by this physician? NO    Rooming Documentation:  Questionnaire(s) completed    Reason for visit: Consult    Yolanda OSBORNE

## 2025-05-12 ENCOUNTER — LAB (OUTPATIENT)
Dept: LAB | Facility: CLINIC | Age: 35
End: 2025-05-12
Payer: COMMERCIAL

## 2025-05-12 DIAGNOSIS — R10.9 ABDOMINAL CRAMPING: ICD-10-CM

## 2025-05-12 DIAGNOSIS — R14.0 ABDOMINAL BLOATING: ICD-10-CM

## 2025-05-12 DIAGNOSIS — R19.4 CHANGE IN BOWEL HABIT: ICD-10-CM

## 2025-06-02 LAB
CALPROTECTIN STL-MCNT: <5 MG/KG (ref 0–49.9)
ELASTASE PANC STL-MCNT: >800 UG/G

## 2025-06-05 ENCOUNTER — RESULTS FOLLOW-UP (OUTPATIENT)
Dept: GASTROENTEROLOGY | Facility: CLINIC | Age: 35
End: 2025-06-05

## 2025-06-05 DIAGNOSIS — K52.9 CHRONIC DIARRHEA: Primary | ICD-10-CM

## 2025-06-05 LAB — MAYO MISC RESULT: ABNORMAL

## 2025-06-05 RX ORDER — CHOLESTYRAMINE 4 G/9G
1 POWDER, FOR SUSPENSION ORAL 2 TIMES DAILY WITH MEALS
Qty: 60 EACH | Refills: 1 | Status: SHIPPED | OUTPATIENT
Start: 2025-06-05

## 2025-08-12 DIAGNOSIS — K52.9 CHRONIC DIARRHEA: ICD-10-CM

## 2025-08-12 RX ORDER — CHOLESTYRAMINE 4 G/9G
1 POWDER, FOR SUSPENSION ORAL DAILY
Qty: 90 EACH | Refills: 1 | Status: SHIPPED | OUTPATIENT
Start: 2025-08-12